# Patient Record
Sex: FEMALE | Race: WHITE | Employment: UNEMPLOYED | URBAN - METROPOLITAN AREA
[De-identification: names, ages, dates, MRNs, and addresses within clinical notes are randomized per-mention and may not be internally consistent; named-entity substitution may affect disease eponyms.]

---

## 2017-01-24 ENCOUNTER — HOSPITAL ENCOUNTER (EMERGENCY)
Age: 22
Discharge: HOME | End: 2017-01-25
Attending: EMERGENCY MEDICINE
Payer: COMMERCIAL

## 2017-01-24 ENCOUNTER — APPOINTMENT (OUTPATIENT)
Dept: ULTRASOUND IMAGING | Age: 22
End: 2017-01-24
Attending: EMERGENCY MEDICINE
Payer: COMMERCIAL

## 2017-01-24 DIAGNOSIS — O20.9 VAGINAL BLEEDING IN PREGNANCY, FIRST TRIMESTER: Primary | ICD-10-CM

## 2017-01-24 DIAGNOSIS — O20.0 MISCARRIAGE, THREATENED, EARLY PREGNANCY: ICD-10-CM

## 2017-01-24 LAB
ABO + RH BLD: NORMAL
ALBUMIN SERPL BCP-MCNC: 3.6 G/DL (ref 3.5–5)
ALBUMIN/GLOB SERPL: 1 {RATIO} (ref 1.1–2.2)
ALP SERPL-CCNC: 65 U/L (ref 45–117)
ALT SERPL-CCNC: 24 U/L (ref 12–78)
ANION GAP BLD CALC-SCNC: 10 MMOL/L (ref 5–15)
APPEARANCE UR: CLEAR
AST SERPL W P-5'-P-CCNC: 10 U/L (ref 15–37)
BACTERIA URNS QL MICRO: ABNORMAL /HPF
BASOPHILS # BLD AUTO: 0 K/UL (ref 0–0.1)
BASOPHILS # BLD: 0 % (ref 0–1)
BILIRUB SERPL-MCNC: 0.1 MG/DL (ref 0.2–1)
BILIRUB UR QL: NEGATIVE
BLOOD GROUP ANTIBODIES SERPL: NORMAL
BUN SERPL-MCNC: 13 MG/DL (ref 6–20)
BUN/CREAT SERPL: 16 (ref 12–20)
CALCIUM SERPL-MCNC: 8.5 MG/DL (ref 8.5–10.1)
CHLORIDE SERPL-SCNC: 108 MMOL/L (ref 97–108)
CLUE CELLS VAG QL WET PREP: NORMAL
CO2 SERPL-SCNC: 24 MMOL/L (ref 21–32)
COLOR UR: ABNORMAL
CREAT SERPL-MCNC: 0.83 MG/DL (ref 0.55–1.02)
EOSINOPHIL # BLD: 0.1 K/UL (ref 0–0.4)
EOSINOPHIL NFR BLD: 2 % (ref 0–7)
EPITH CASTS URNS QL MICRO: ABNORMAL /LPF
ERYTHROCYTE [DISTWIDTH] IN BLOOD BY AUTOMATED COUNT: 15.2 % (ref 11.5–14.5)
GLOBULIN SER CALC-MCNC: 3.5 G/DL (ref 2–4)
GLUCOSE SERPL-MCNC: 102 MG/DL (ref 65–100)
GLUCOSE UR STRIP.AUTO-MCNC: NEGATIVE MG/DL
HCG SERPL-ACNC: 20 MIU/ML (ref 0–6)
HCT VFR BLD AUTO: 37.9 % (ref 35–47)
HGB BLD-MCNC: 12.3 G/DL (ref 11.5–16)
HGB UR QL STRIP: ABNORMAL
KETONES UR QL STRIP.AUTO: NEGATIVE MG/DL
KOH PREP SPEC: NORMAL
LEUKOCYTE ESTERASE UR QL STRIP.AUTO: NEGATIVE
LYMPHOCYTES # BLD AUTO: 31 % (ref 12–49)
LYMPHOCYTES # BLD: 2.5 K/UL (ref 0.8–3.5)
MCH RBC QN AUTO: 26.7 PG (ref 26–34)
MCHC RBC AUTO-ENTMCNC: 32.5 G/DL (ref 30–36.5)
MCV RBC AUTO: 82.2 FL (ref 80–99)
MONOCYTES # BLD: 0.6 K/UL (ref 0–1)
MONOCYTES NFR BLD AUTO: 8 % (ref 5–13)
NEUTS SEG # BLD: 4.8 K/UL (ref 1.8–8)
NEUTS SEG NFR BLD AUTO: 59 % (ref 32–75)
NITRITE UR QL STRIP.AUTO: NEGATIVE
PH UR STRIP: 7.5 [PH] (ref 5–8)
PLATELET # BLD AUTO: 302 K/UL (ref 150–400)
POTASSIUM SERPL-SCNC: 3.7 MMOL/L (ref 3.5–5.1)
PROT SERPL-MCNC: 7.1 G/DL (ref 6.4–8.2)
PROT UR STRIP-MCNC: NEGATIVE MG/DL
RBC # BLD AUTO: 4.61 M/UL (ref 3.8–5.2)
RBC #/AREA URNS HPF: ABNORMAL /HPF (ref 0–5)
SERVICE CMNT-IMP: NORMAL
SODIUM SERPL-SCNC: 142 MMOL/L (ref 136–145)
SP GR UR REFRACTOMETRY: 1.01 (ref 1–1.03)
SPECIMEN EXP DATE BLD: NORMAL
T VAGINALIS VAG QL WET PREP: NORMAL
UA: UC IF INDICATED,UAUC: ABNORMAL
UROBILINOGEN UR QL STRIP.AUTO: 0.2 EU/DL (ref 0.2–1)
WBC # BLD AUTO: 8 K/UL (ref 3.6–11)
WBC URNS QL MICRO: ABNORMAL /HPF (ref 0–4)

## 2017-01-24 PROCEDURE — 76817 TRANSVAGINAL US OBSTETRIC: CPT

## 2017-01-24 PROCEDURE — 86900 BLOOD TYPING SEROLOGIC ABO: CPT | Performed by: EMERGENCY MEDICINE

## 2017-01-24 PROCEDURE — 87086 URINE CULTURE/COLONY COUNT: CPT | Performed by: EMERGENCY MEDICINE

## 2017-01-24 PROCEDURE — 99284 EMERGENCY DEPT VISIT MOD MDM: CPT

## 2017-01-24 PROCEDURE — 87491 CHLMYD TRACH DNA AMP PROBE: CPT | Performed by: EMERGENCY MEDICINE

## 2017-01-24 PROCEDURE — 36415 COLL VENOUS BLD VENIPUNCTURE: CPT | Performed by: EMERGENCY MEDICINE

## 2017-01-24 PROCEDURE — 80053 COMPREHEN METABOLIC PANEL: CPT | Performed by: EMERGENCY MEDICINE

## 2017-01-24 PROCEDURE — 87210 SMEAR WET MOUNT SALINE/INK: CPT | Performed by: EMERGENCY MEDICINE

## 2017-01-24 PROCEDURE — 81001 URINALYSIS AUTO W/SCOPE: CPT | Performed by: EMERGENCY MEDICINE

## 2017-01-24 PROCEDURE — 85025 COMPLETE CBC W/AUTO DIFF WBC: CPT | Performed by: EMERGENCY MEDICINE

## 2017-01-24 PROCEDURE — 84702 CHORIONIC GONADOTROPIN TEST: CPT | Performed by: EMERGENCY MEDICINE

## 2017-01-24 NOTE — LETTER
[place] [address] 
[facility] EMERGENCY DEPT 
[address] [place] [address] [address] [address] [phone] Work/School Note Date: 1/24/2017 To Whom It May concern: 
 
Edil Dhillon was seen and treated today in the emergency room by the following provider(s): 
Attending Provider: Halima Cook MD. Edil Dhillon may return to work on 1/27/17.  
 
Sincerely, 
 
 
 
 
Halima Cook MD

## 2017-01-25 VITALS
OXYGEN SATURATION: 98 % | DIASTOLIC BLOOD PRESSURE: 78 MMHG | HEART RATE: 86 BPM | RESPIRATION RATE: 16 BRPM | HEIGHT: 61 IN | WEIGHT: 158.51 LBS | SYSTOLIC BLOOD PRESSURE: 118 MMHG | TEMPERATURE: 97.9 F | BODY MASS INDEX: 29.93 KG/M2

## 2017-01-25 LAB
C TRACH DNA SPEC QL NAA+PROBE: NEGATIVE
N GONORRHOEA DNA SPEC QL NAA+PROBE: NEGATIVE
SAMPLE TYPE: NORMAL
SERVICE CMNT-IMP: NORMAL
SPECIMEN SOURCE: NORMAL

## 2017-01-25 NOTE — DISCHARGE INSTRUCTIONS

## 2017-01-25 NOTE — ED NOTES
Patient back from [address] [place] [address],[address], resting comfortably in stretcher at this time.

## 2017-01-25 NOTE — ED PROVIDER NOTES
HPI Comments: Waldemar Harding is a 6 week gravid by date, 23 y.o. female () who presents ambulatory to the ED with c/o lower abd pain and vaginal bleeding x 1700 this evening. She describes the pain as cramping in nature and states the bleeding is heavier than just spotting but lighter than a normal MP. She denies having similar issues with bleeding with both her past pregnancies. She further denies any urinary sxs, nausea, or vomiting. OBGYN: Ernesto Madsen MD  PCP: Samantha Verdugo MD   PMHx significant for: pt denies   PSHx significant for: hip surgeries,  x 2, lap cholecystectomy   Social Hx:  smoking (-)                      alcohol (-)                      illicit drugs (-) denies                       There are no other complaints, changes, or physical findings at this time. Written by EDUARDO Mota, as dictated by Pearl Coffman MD     The history is provided by the patient. Past Medical History:   Diagnosis Date    Chronic pain      right hip       Past Surgical History:   Procedure Laterality Date    Hx wisdom teeth extraction      Hx orthopaedic  2012     arthroscopy right hip    Hx other surgical       right hip surgery- collapse of labrum     Pr abdomen surgery proc unlisted  7/1/15     CHOLECYSTECTOMY LAPAROSCOPIC W/GRAMS     Pr  delivery only           Family History:   Problem Relation Age of Onset    Hypertension Mother        Social History     Social History    Marital status:      Spouse name: N/A    Number of children: N/A    Years of education: N/A     Occupational History    Not on file.      Social History Main Topics    Smoking status: Never Smoker    Smokeless tobacco: Not on file    Alcohol use No    Drug use: No    Sexual activity: No     Other Topics Concern    Not on file     Social History Narrative    ** Merged History Encounter **              ALLERGIES: Review of patient's allergies indicates no known allergies. Review of Systems   Constitutional: Negative. Negative for fever. HENT: Negative. Eyes: Negative. Respiratory: Negative. Negative for shortness of breath. Cardiovascular: Negative. Negative for chest pain. Gastrointestinal: Positive for abdominal pain. Negative for nausea and vomiting. Endocrine: Negative. Genitourinary: Positive for vaginal bleeding. Negative for difficulty urinating, dysuria and hematuria. Musculoskeletal: Negative. Skin: Negative. Allergic/Immunologic: Negative. Neurological: Negative. Psychiatric/Behavioral: Negative for suicidal ideas. All other systems reviewed and are negative. Patient Vitals for the past 12 hrs:   Temp Pulse Resp BP SpO2   01/25/17 0018 - - - - 99 %   01/25/17 0015 - - - 101/57 -   01/24/17 2245 - - - 108/67 98 %   01/24/17 2215 - - - 117/72 98 %   01/24/17 2138 97.9 °F (36.6 °C) 86 16 141/83 98 %             Physical Exam   Constitutional: She is oriented to person, place, and time. She appears well-developed and well-nourished. No distress. HENT:   Head: Normocephalic and atraumatic. Nose: Nose normal.   Eyes: Conjunctivae and EOM are normal. No scleral icterus. Neck: Normal range of motion. No tracheal deviation present. Cardiovascular: Normal rate, regular rhythm, normal heart sounds and intact distal pulses. Exam reveals no friction rub. No murmur heard. Pulmonary/Chest: Effort normal and breath sounds normal. No stridor. No respiratory distress. She has no wheezes. She has no rales. Abdominal: Soft. Bowel sounds are normal. She exhibits no distension. There is no tenderness. There is no rebound. Genitourinary: Pelvic exam was performed with patient supine. There is no rash or tenderness on the right labia. There is no rash on the left labia. Uterus is enlarged (slightly). Cervix exhibits no motion tenderness and no friability. Right adnexum displays no mass, no tenderness and no fullness.  Left adnexum displays no mass, no tenderness and no fullness. There is bleeding (from os, small amount, dark red, os closed) in the vagina. No vaginal discharge found. Musculoskeletal: Normal range of motion. She exhibits no tenderness. Neurological: She is alert and oriented to person, place, and time. No cranial nerve deficit. Skin: Skin is warm and dry. No rash noted. She is not diaphoretic. Psychiatric: She has a normal mood and affect. Her behavior is normal. Judgment and thought content normal.   Nursing note and vitals reviewed. MDM  Number of Diagnoses or Management Options  Miscarriage, threatened, early pregnancy:   Vaginal bleeding in pregnancy, first trimester:   Diagnosis management comments: DDX:  Miscarriage, threatened ab, subchorionic hemorrhage, uti    Plan:  Labs, ua, type and screen, US    Impression:  Threatened miscarriage         Amount and/or Complexity of Data Reviewed  Clinical lab tests: ordered and reviewed  Tests in the radiology section of CPT®: ordered and reviewed  Review and summarize past medical records: yes  Independent visualization of images, tracings, or specimens: yes      ED Course       Procedures     I reviewed our electronic medical record system for any past medical records that were available that may contribute to the patients current condition, the nursing notes and and vital signs from today's visit    Nursing notes will be reviewed as they become available in realtime while the pt is in the ED. Harrison Berry MD     Procedure Note - Pelvic Exam:    11:15 PM  Performed by: Harrison Berry MD   Chaperoned by: [address], RN  Pelvic exam was performed using bimanual and speculum. Small amount of dark red bleeding from the cervical os, os is closed. No tenderness on exam, no discharge  The procedure took 1-15 minutes, and pt tolerated well. Written by Mar Voss ED Scribe, as dictated by Harrison Berry MD .     Progress Note:  11:57 PM  Beta hcg is only 20. Pt back from [address] [place] [address],[address], awaiting results. Written by Dontae Voss, ED scribe, as dictated by Ayo Varghese MD      Progress note:  1:11 AM  US resulted, shows no gestational sac or IUP. Pt noted to be feeling better, states she has gone to the bathroom and her bleeding has ceased. She is ready for discharge. Discussed lab and imaging findings with pt and the plan for d/c home to f/u with her OBGYN. Will follow up as instructed. All questions have been answered, pt voiced understanding and agreement with plan. If narcotics were prescribed, pt was advised not to drive or operate heavy machinery. If abx were prescribed, pt advised that diarrhea and rash are possible side effects of the medications. Specific return precautions provided as well as instructions to return to the ED should sx worsen at any time. Ayo Varghese MD      I personally reviewed pt's imaging. Official read by radiology listed below. Ayo Varghese MD     LABORATORY TESTS:  Recent Results (from the past 12 hour(s))   CBC WITH AUTOMATED DIFF    Collection Time: 01/24/17 10:07 PM   Result Value Ref Range    WBC 8.0 3.6 - 11.0 K/uL    RBC 4.61 3.80 - 5.20 M/uL    HGB 12.3 11.5 - 16.0 g/dL    HCT 37.9 35.0 - 47.0 %    MCV 82.2 80.0 - 99.0 FL    MCH 26.7 26.0 - 34.0 PG    MCHC 32.5 30.0 - 36.5 g/dL    RDW 15.2 (H) 11.5 - 14.5 %    PLATELET 794 266 - 956 K/uL    NEUTROPHILS 59 32 - 75 %    LYMPHOCYTES 31 12 - 49 %    MONOCYTES 8 5 - 13 %    EOSINOPHILS 2 0 - 7 %    BASOPHILS 0 0 - 1 %    ABS. NEUTROPHILS 4.8 1.8 - 8.0 K/UL    ABS. LYMPHOCYTES 2.5 0.8 - 3.5 K/UL    ABS. MONOCYTES 0.6 0.0 - 1.0 K/UL    ABS. EOSINOPHILS 0.1 0.0 - 0.4 K/UL    ABS. BASOPHILS 0.0 0.0 - 0.1 K/UL   TOTAL HCG, QT.     Collection Time: 01/24/17 10:07 PM   Result Value Ref Range    HCG, Qt. 20 (H) 0 - 6 MIU/ML   TYPE & SCREEN    Collection Time: 01/24/17 10:07 PM   Result Value Ref Range    Crossmatch Expiration 01/27/2017     ABO/Rh(D) Cady Galvin POSITIVE     Antibody screen NEG WET PREP    Collection Time: 01/24/17 10:07 PM   Result Value Ref Range    Clue cells CLUE CELLS ABSENT      Wet prep NO TRICHOMONAS SEEN     KOH, OTHER SOURCES    Collection Time: 01/24/17 10:07 PM   Result Value Ref Range    Special Requests: NO SPECIAL REQUESTS      KOH NO YEAST SEEN     METABOLIC PANEL, COMPREHENSIVE    Collection Time: 01/24/17 10:07 PM   Result Value Ref Range    Sodium 142 136 - 145 mmol/L    Potassium 3.7 3.5 - 5.1 mmol/L    Chloride 108 97 - 108 mmol/L    CO2 24 21 - 32 mmol/L    Anion gap 10 5 - 15 mmol/L    Glucose 102 (H) 65 - 100 mg/dL    BUN 13 6 - 20 MG/DL    Creatinine 0.83 0.55 - 1.02 MG/DL    BUN/Creatinine ratio 16 12 - 20      GFR est AA >60 >60 ml/min/1.73m2    GFR est non-AA >60 >60 ml/min/1.73m2    Calcium 8.5 8.5 - 10.1 MG/DL    Bilirubin, total 0.1 (L) 0.2 - 1.0 MG/DL    ALT 24 12 - 78 U/L    AST 10 (L) 15 - 37 U/L    Alk. phosphatase 65 45 - 117 U/L    Protein, total 7.1 6.4 - 8.2 g/dL    Albumin 3.6 3.5 - 5.0 g/dL    Globulin 3.5 2.0 - 4.0 g/dL    A-G Ratio 1.0 (L) 1.1 - 2.2     URINALYSIS W/ REFLEX CULTURE    Collection Time: 01/24/17 10:59 PM   Result Value Ref Range    Color YELLOW/STRAW      Appearance CLEAR CLEAR      Specific gravity 1.015 1.003 - 1.030      pH (UA) 7.5 5.0 - 8.0      Protein NEGATIVE  NEG mg/dL    Glucose NEGATIVE  NEG mg/dL    Ketone NEGATIVE  NEG mg/dL    Bilirubin NEGATIVE  NEG      Blood LARGE (A) NEG      Urobilinogen 0.2 0.2 - 1.0 EU/dL    Nitrites NEGATIVE  NEG      Leukocyte Esterase NEGATIVE  NEG      WBC 0-4 0 - 4 /hpf    RBC 20-50 0 - 5 /hpf    Epithelial cells MODERATE (A) FEW /lpf    Bacteria 1+ (A) NEG /hpf    UA:UC IF INDICATED URINE CULTURE ORDERED (A) CNI         IMAGING RESULTS:  Study Result      Clinical history: Bleeding and abdominal pain  The patient is 6-7 weeks pregnant. Last menstrual period 6 weeks and 2 days  prior.   INDICATION: Bleeding and abdominal pain        Transvaginal ultrasound: Realtime sonographic imaging of the pelvis was  performed transvaginally. By report the patient is 6-7 weeks pregnant. There is  no intrauterine gestational sac identified. The uterus measures 8.5 x 4.7 x 6.1  cm. The right ovary 2.2 x 2.6 cm the left ovary 3.4 x 2.0 cm. Endometrial stripe  1.6 cm. . There is normal flow identified to both ovaries.     They are normal in appearance. No free fluid.      IMPRESSION  IMPRESSION:     No IUP is identified.     The cervix is within normal limits.     No evidence of ovarian torsion. MEDICATIONS GIVEN:  Medications - No data to display    IMPRESSION:  1. Vaginal bleeding in pregnancy, first trimester    2. Miscarriage, threatened, early pregnancy        PLAN:  1. Discharge home  Current Discharge Medication List      CONTINUE these medications which have NOT CHANGED    Details   PRENATAL VIT/IRON FUMARATE/FA (PRENATAL PO) Take  by mouth. Follow-up Information     Follow up With Details Comments Contact Info    Raoul Hester MD Call today to have an appointment and f/u for hcg testing [phone]   [facility] [place]  [address] [address] [phone]        Return to ED if worse     DISCHARGE NOTE  1:12 AM   Pt has been re-evaluated. She has no new complaints, changes, or physical findings. All diagnostic results have been reviewed and discussed with the pt. Care plan has been outlined and discussed, and she understands all current sx, dx, tx, and rx. All of the pt's questions have been answered and concerns addressed. All medications have been reviewed with the pt. She was instructed to and agrees to follow up with her OBGYN, or to return to the ED should her sxs worsen prior to follow up. Elias Cody is ready for discharge. This note is prepared by Levi Ledesma, acting as scribe for Ani Lepe MD.     Ani Lepe MD: The scribe's documentation has been prepared under my direction and personally reviewed by me in its entirety.  I confirm that the note above accurately reflects all work, treatment, procedures, and medical decision making performed by me                                         This note will not be viewable in [address]

## 2017-01-25 NOTE — ED NOTES
Dr. Jaja Wolfe has reviewed discharge instructions with the patient. The patient   verbalized understanding. Patient ambulatory to car with family.

## 2017-01-25 NOTE — ED NOTES
Assumed care of patient from triage. Patient arrives with complaints of lower abdominal cramping that started tonight ~1700 and that she then started to experience vaginal bleeding. Patient states that she is 6-7 weeks pregnant. Patient reports that the bleeding is not as heavy as usual period but that it is heavier than spotting. Patient states last period was 12/11/16. Patient resting comfortably on stretcher.

## 2017-01-26 LAB
BACTERIA SPEC CULT: NORMAL
CC UR VC: NORMAL
SERVICE CMNT-IMP: NORMAL

## 2017-04-24 LAB
ANTIBODY SCREEN, EXTERNAL: NEGATIVE
CHLAMYDIA, EXTERNAL: NEGATIVE
HBSAG, EXTERNAL: NEGATIVE
N. GONORRHEA, EXTERNAL: NEGATIVE
RUBELLA, EXTERNAL: NORMAL
T. PALLIDUM, EXTERNAL: NEGATIVE

## 2018-01-04 LAB
CHLAMYDIA, EXTERNAL: NEGATIVE
HBSAG, EXTERNAL: NEGATIVE
HIV, EXTERNAL: NON REACTIVE
N. GONORRHEA, EXTERNAL: NEGATIVE
RUBELLA, EXTERNAL: NORMAL

## 2018-04-20 LAB
HBSAG, EXTERNAL: NEGATIVE
T. PALLIDUM, EXTERNAL: NEGATIVE

## 2018-06-19 LAB — GRBS, EXTERNAL: NEGATIVE

## 2018-07-09 ENCOUNTER — HOSPITAL ENCOUNTER (INPATIENT)
Age: 23
LOS: 2 days | Discharge: HOME | End: 2018-07-11
Attending: OBSTETRICS & GYNECOLOGY | Admitting: OBSTETRICS & GYNECOLOGY
Payer: COMMERCIAL

## 2018-07-09 ENCOUNTER — ANESTHESIA (OUTPATIENT)
Dept: LABOR AND DELIVERY | Age: 23
End: 2018-07-09
Payer: COMMERCIAL

## 2018-07-09 ENCOUNTER — ANESTHESIA EVENT (OUTPATIENT)
Dept: LABOR AND DELIVERY | Age: 23
End: 2018-07-09
Payer: COMMERCIAL

## 2018-07-09 LAB
ABO + RH BLD: NORMAL
BASOPHILS # BLD: 0 K/UL (ref 0–0.1)
BASOPHILS NFR BLD: 0 % (ref 0–1)
BLOOD GROUP ANTIBODIES SERPL: NORMAL
DIFFERENTIAL METHOD BLD: ABNORMAL
EOSINOPHIL # BLD: 0.1 K/UL (ref 0–0.4)
EOSINOPHIL NFR BLD: 1 % (ref 0–7)
ERYTHROCYTE [DISTWIDTH] IN BLOOD BY AUTOMATED COUNT: 14.6 % (ref 11.5–14.5)
ERYTHROCYTE [DISTWIDTH] IN BLOOD BY AUTOMATED COUNT: 14.6 % (ref 11.5–14.5)
HCT VFR BLD AUTO: 29.9 % (ref 35–47)
HCT VFR BLD AUTO: 35 % (ref 35–47)
HGB BLD-MCNC: 11.3 G/DL (ref 11.5–16)
HGB BLD-MCNC: 9.6 G/DL (ref 11.5–16)
IMM GRANULOCYTES # BLD: 0 K/UL (ref 0–0.04)
IMM GRANULOCYTES NFR BLD AUTO: 0 % (ref 0–0.5)
LYMPHOCYTES # BLD: 2.3 K/UL (ref 0.8–3.5)
LYMPHOCYTES NFR BLD: 29 % (ref 12–49)
MCH RBC QN AUTO: 26.2 PG (ref 26–34)
MCH RBC QN AUTO: 26.5 PG (ref 26–34)
MCHC RBC AUTO-ENTMCNC: 32.1 G/DL (ref 30–36.5)
MCHC RBC AUTO-ENTMCNC: 32.3 G/DL (ref 30–36.5)
MCV RBC AUTO: 81.2 FL (ref 80–99)
MCV RBC AUTO: 82.6 FL (ref 80–99)
MONOCYTES # BLD: 0.9 K/UL (ref 0–1)
MONOCYTES NFR BLD: 11 % (ref 5–13)
NEUTS SEG # BLD: 4.5 K/UL (ref 1.8–8)
NEUTS SEG NFR BLD: 58 % (ref 32–75)
NRBC # BLD: 0 K/UL (ref 0–0.01)
NRBC # BLD: 0 K/UL (ref 0–0.01)
NRBC BLD-RTO: 0 PER 100 WBC
NRBC BLD-RTO: 0 PER 100 WBC
PLATELET # BLD AUTO: 195 K/UL (ref 150–400)
PLATELET # BLD AUTO: 260 K/UL (ref 150–400)
PMV BLD AUTO: 10.5 FL (ref 8.9–12.9)
PMV BLD AUTO: 10.9 FL (ref 8.9–12.9)
RBC # BLD AUTO: 3.62 M/UL (ref 3.8–5.2)
RBC # BLD AUTO: 4.31 M/UL (ref 3.8–5.2)
SPECIMEN EXP DATE BLD: NORMAL
WBC # BLD AUTO: 11.2 K/UL (ref 3.6–11)
WBC # BLD AUTO: 7.7 K/UL (ref 3.6–11)

## 2018-07-09 PROCEDURE — 74011250636 HC RX REV CODE- 250/636: Performed by: OBSTETRICS & GYNECOLOGY

## 2018-07-09 PROCEDURE — 76010000391 HC C SECN FIRST 1 HR: Performed by: OBSTETRICS & GYNECOLOGY

## 2018-07-09 PROCEDURE — 74011250636 HC RX REV CODE- 250/636

## 2018-07-09 PROCEDURE — 77030014125 HC TY EPDRL BBMI -B: Performed by: NURSE ANESTHETIST, CERTIFIED REGISTERED

## 2018-07-09 PROCEDURE — 86900 BLOOD TYPING SEROLOGIC ABO: CPT | Performed by: OBSTETRICS & GYNECOLOGY

## 2018-07-09 PROCEDURE — 77030018846 HC SOL IRR STRL H20 ICUM -A

## 2018-07-09 PROCEDURE — 85027 COMPLETE CBC AUTOMATED: CPT | Performed by: OBSTETRICS & GYNECOLOGY

## 2018-07-09 PROCEDURE — 77030002974 HC SUT PLN J&J -A

## 2018-07-09 PROCEDURE — 75410000002 HC LABOR FEE PER 1 HR

## 2018-07-09 PROCEDURE — 76060000078 HC EPIDURAL ANESTHESIA: Performed by: OBSTETRICS & GYNECOLOGY

## 2018-07-09 PROCEDURE — 77030018836 HC SOL IRR NACL ICUM -A

## 2018-07-09 PROCEDURE — 77030003666 HC NDL SPINAL BD -A: Performed by: NURSE ANESTHETIST, CERTIFIED REGISTERED

## 2018-07-09 PROCEDURE — 74011000250 HC RX REV CODE- 250

## 2018-07-09 PROCEDURE — 77030002933 HC SUT MCRYL J&J -A

## 2018-07-09 PROCEDURE — 74011250637 HC RX REV CODE- 250/637: Performed by: OBSTETRICS & GYNECOLOGY

## 2018-07-09 PROCEDURE — 36415 COLL VENOUS BLD VENIPUNCTURE: CPT | Performed by: OBSTETRICS & GYNECOLOGY

## 2018-07-09 PROCEDURE — 65410000002 HC RM PRIVATE OB

## 2018-07-09 PROCEDURE — 76010000392 HC C SECN EA ADDL 0.5 HR: Performed by: OBSTETRICS & GYNECOLOGY

## 2018-07-09 PROCEDURE — 85025 COMPLETE CBC W/AUTO DIFF WBC: CPT | Performed by: OBSTETRICS & GYNECOLOGY

## 2018-07-09 PROCEDURE — 75410000003 HC RECOV DEL/VAG/CSECN EA 0.5 HR

## 2018-07-09 PROCEDURE — 77030031139 HC SUT VCRL2 J&J -A

## 2018-07-09 PROCEDURE — 77030008459 HC STPLR SKN COOP -B

## 2018-07-09 PROCEDURE — 76060000033 HC ANESTHESIA 1 TO 1.5 HR: Performed by: OBSTETRICS & GYNECOLOGY

## 2018-07-09 PROCEDURE — 77010026065 HC OXYGEN MINIMUM MEDICAL AIR

## 2018-07-09 RX ORDER — SODIUM CHLORIDE, SODIUM LACTATE, POTASSIUM CHLORIDE, CALCIUM CHLORIDE 600; 310; 30; 20 MG/100ML; MG/100ML; MG/100ML; MG/100ML
1000 INJECTION, SOLUTION INTRAVENOUS CONTINUOUS
Status: DISCONTINUED | OUTPATIENT
Start: 2018-07-09 | End: 2018-07-09 | Stop reason: HOSPADM

## 2018-07-09 RX ORDER — NALOXONE HYDROCHLORIDE 0.4 MG/ML
0.2 INJECTION, SOLUTION INTRAMUSCULAR; INTRAVENOUS; SUBCUTANEOUS
Status: ACTIVE | OUTPATIENT
Start: 2018-07-09 | End: 2018-07-10

## 2018-07-09 RX ORDER — ONDANSETRON 2 MG/ML
4 INJECTION INTRAMUSCULAR; INTRAVENOUS
Status: DISCONTINUED | OUTPATIENT
Start: 2018-07-09 | End: 2018-07-11 | Stop reason: HOSPADM

## 2018-07-09 RX ORDER — SIMETHICONE 80 MG
80 TABLET,CHEWABLE ORAL AS NEEDED
Status: DISCONTINUED | OUTPATIENT
Start: 2018-07-09 | End: 2018-07-11 | Stop reason: HOSPADM

## 2018-07-09 RX ORDER — BUPIVACAINE HYDROCHLORIDE 7.5 MG/ML
INJECTION, SOLUTION EPIDURAL; RETROBULBAR AS NEEDED
Status: DISCONTINUED | OUTPATIENT
Start: 2018-07-09 | End: 2018-07-09 | Stop reason: HOSPADM

## 2018-07-09 RX ORDER — SODIUM CHLORIDE 0.9 % (FLUSH) 0.9 %
5-10 SYRINGE (ML) INJECTION AS NEEDED
Status: DISCONTINUED | OUTPATIENT
Start: 2018-07-09 | End: 2018-07-09 | Stop reason: HOSPADM

## 2018-07-09 RX ORDER — ACETAMINOPHEN 10 MG/ML
INJECTION, SOLUTION INTRAVENOUS AS NEEDED
Status: DISCONTINUED | OUTPATIENT
Start: 2018-07-09 | End: 2018-07-09 | Stop reason: HOSPADM

## 2018-07-09 RX ORDER — OXYTOCIN/RINGER'S LACTATE 20/1000 ML
125-500 PLASTIC BAG, INJECTION (ML) INTRAVENOUS ONCE
Status: ACTIVE | OUTPATIENT
Start: 2018-07-09 | End: 2018-07-09

## 2018-07-09 RX ORDER — SODIUM CHLORIDE, SODIUM LACTATE, POTASSIUM CHLORIDE, CALCIUM CHLORIDE 600; 310; 30; 20 MG/100ML; MG/100ML; MG/100ML; MG/100ML
125 INJECTION, SOLUTION INTRAVENOUS CONTINUOUS
Status: DISCONTINUED | OUTPATIENT
Start: 2018-07-09 | End: 2018-07-11 | Stop reason: HOSPADM

## 2018-07-09 RX ORDER — ONDANSETRON 2 MG/ML
INJECTION INTRAMUSCULAR; INTRAVENOUS AS NEEDED
Status: DISCONTINUED | OUTPATIENT
Start: 2018-07-09 | End: 2018-07-09 | Stop reason: HOSPADM

## 2018-07-09 RX ORDER — KETOROLAC TROMETHAMINE 30 MG/ML
30 INJECTION, SOLUTION INTRAMUSCULAR; INTRAVENOUS
Status: DISPENSED | OUTPATIENT
Start: 2018-07-09 | End: 2018-07-10

## 2018-07-09 RX ORDER — OXYCODONE AND ACETAMINOPHEN 5; 325 MG/1; MG/1
2 TABLET ORAL
Status: DISCONTINUED | OUTPATIENT
Start: 2018-07-09 | End: 2018-07-11 | Stop reason: HOSPADM

## 2018-07-09 RX ORDER — NALOXONE HYDROCHLORIDE 0.4 MG/ML
0.4 INJECTION, SOLUTION INTRAMUSCULAR; INTRAVENOUS; SUBCUTANEOUS AS NEEDED
Status: DISCONTINUED | OUTPATIENT
Start: 2018-07-09 | End: 2018-07-11 | Stop reason: HOSPADM

## 2018-07-09 RX ORDER — PHENYLEPHRINE HCL IN 0.9% NACL 0.4MG/10ML
SYRINGE (ML) INTRAVENOUS AS NEEDED
Status: DISCONTINUED | OUTPATIENT
Start: 2018-07-09 | End: 2018-07-09 | Stop reason: HOSPADM

## 2018-07-09 RX ORDER — SODIUM CHLORIDE 0.9 % (FLUSH) 0.9 %
5-10 SYRINGE (ML) INJECTION AS NEEDED
Status: DISCONTINUED | OUTPATIENT
Start: 2018-07-09 | End: 2018-07-11 | Stop reason: HOSPADM

## 2018-07-09 RX ORDER — IBUPROFEN 400 MG/1
800 TABLET ORAL EVERY 8 HOURS
Status: DISCONTINUED | OUTPATIENT
Start: 2018-07-09 | End: 2018-07-11 | Stop reason: HOSPADM

## 2018-07-09 RX ORDER — CEFAZOLIN SODIUM/WATER 2 G/20 ML
2 SYRINGE (ML) INTRAVENOUS ONCE
Status: COMPLETED | OUTPATIENT
Start: 2018-07-09 | End: 2018-07-09

## 2018-07-09 RX ORDER — DIPHENHYDRAMINE HYDROCHLORIDE 50 MG/ML
12.5 INJECTION, SOLUTION INTRAMUSCULAR; INTRAVENOUS
Status: DISCONTINUED | OUTPATIENT
Start: 2018-07-09 | End: 2018-07-11 | Stop reason: HOSPADM

## 2018-07-09 RX ORDER — OXYTOCIN/RINGER'S LACTATE 20/1000 ML
PLASTIC BAG, INJECTION (ML) INTRAVENOUS
Status: DISCONTINUED | OUTPATIENT
Start: 2018-07-09 | End: 2018-07-09 | Stop reason: HOSPADM

## 2018-07-09 RX ORDER — DOCUSATE SODIUM 100 MG/1
100 CAPSULE, LIQUID FILLED ORAL 2 TIMES DAILY
Status: DISCONTINUED | OUTPATIENT
Start: 2018-07-09 | End: 2018-07-11 | Stop reason: HOSPADM

## 2018-07-09 RX ORDER — EPHEDRINE SULFATE 50 MG/ML
INJECTION, SOLUTION INTRAVENOUS AS NEEDED
Status: DISCONTINUED | OUTPATIENT
Start: 2018-07-09 | End: 2018-07-09 | Stop reason: HOSPADM

## 2018-07-09 RX ORDER — MORPHINE SULFATE 0.5 MG/ML
INJECTION, SOLUTION EPIDURAL; INTRATHECAL; INTRAVENOUS AS NEEDED
Status: DISCONTINUED | OUTPATIENT
Start: 2018-07-09 | End: 2018-07-09 | Stop reason: HOSPADM

## 2018-07-09 RX ORDER — SODIUM CHLORIDE 0.9 % (FLUSH) 0.9 %
5-10 SYRINGE (ML) INJECTION EVERY 8 HOURS
Status: DISCONTINUED | OUTPATIENT
Start: 2018-07-09 | End: 2018-07-11 | Stop reason: HOSPADM

## 2018-07-09 RX ORDER — SODIUM CHLORIDE 0.9 % (FLUSH) 0.9 %
5-10 SYRINGE (ML) INJECTION EVERY 8 HOURS
Status: DISCONTINUED | OUTPATIENT
Start: 2018-07-09 | End: 2018-07-09 | Stop reason: HOSPADM

## 2018-07-09 RX ADMIN — Medication 80 MCG: at 08:15

## 2018-07-09 RX ADMIN — IBUPROFEN 800 MG: 400 TABLET ORAL at 21:27

## 2018-07-09 RX ADMIN — MORPHINE SULFATE 500 MCG: 0.5 INJECTION, SOLUTION EPIDURAL; INTRATHECAL; INTRAVENOUS at 08:07

## 2018-07-09 RX ADMIN — SODIUM CHLORIDE, SODIUM LACTATE, POTASSIUM CHLORIDE, AND CALCIUM CHLORIDE 125 ML/HR: 600; 310; 30; 20 INJECTION, SOLUTION INTRAVENOUS at 15:58

## 2018-07-09 RX ADMIN — Medication 80 MCG: at 08:28

## 2018-07-09 RX ADMIN — Medication: at 08:46

## 2018-07-09 RX ADMIN — Medication 120 MCG: at 08:10

## 2018-07-09 RX ADMIN — EPHEDRINE SULFATE 5 MG: 50 INJECTION, SOLUTION INTRAVENOUS at 08:14

## 2018-07-09 RX ADMIN — Medication: at 08:25

## 2018-07-09 RX ADMIN — EPHEDRINE SULFATE 5 MG: 50 INJECTION, SOLUTION INTRAVENOUS at 08:09

## 2018-07-09 RX ADMIN — SODIUM CHLORIDE, SODIUM LACTATE, POTASSIUM CHLORIDE, AND CALCIUM CHLORIDE 125 ML/HR: 600; 310; 30; 20 INJECTION, SOLUTION INTRAVENOUS at 09:00

## 2018-07-09 RX ADMIN — SODIUM CHLORIDE, SODIUM LACTATE, POTASSIUM CHLORIDE, AND CALCIUM CHLORIDE 1000 ML: 600; 310; 30; 20 INJECTION, SOLUTION INTRAVENOUS at 07:48

## 2018-07-09 RX ADMIN — ACETAMINOPHEN 1000 MG: 10 INJECTION, SOLUTION INTRAVENOUS at 08:30

## 2018-07-09 RX ADMIN — Medication 2 G: at 07:48

## 2018-07-09 RX ADMIN — KETOROLAC TROMETHAMINE 30 MG: 30 INJECTION, SOLUTION INTRAMUSCULAR at 13:12

## 2018-07-09 RX ADMIN — SODIUM CHLORIDE, SODIUM LACTATE, POTASSIUM CHLORIDE, AND CALCIUM CHLORIDE 1000 ML: 600; 310; 30; 20 INJECTION, SOLUTION INTRAVENOUS at 06:40

## 2018-07-09 RX ADMIN — SODIUM CHLORIDE, SODIUM LACTATE, POTASSIUM CHLORIDE, AND CALCIUM CHLORIDE: 600; 310; 30; 20 INJECTION, SOLUTION INTRAVENOUS at 07:52

## 2018-07-09 RX ADMIN — EPHEDRINE SULFATE 5 MG: 50 INJECTION, SOLUTION INTRAVENOUS at 08:20

## 2018-07-09 RX ADMIN — BUPIVACAINE HYDROCHLORIDE 11.15 MG: 7.5 INJECTION, SOLUTION EPIDURAL; RETROBULBAR at 08:07

## 2018-07-09 RX ADMIN — ONDANSETRON 4 MG: 2 INJECTION INTRAMUSCULAR; INTRAVENOUS at 08:10

## 2018-07-09 RX ADMIN — Medication 120 MCG: at 08:08

## 2018-07-09 NOTE — IP AVS SNAPSHOT
Summary of Care Report The Summary of Care report has been created to help improve care coordination. Users with access to [employer] or [address] (Web-based application) may access additional patient information including the Discharge Summary. If you are not currently a [address] user and need more information, please call the number listed below in the [place] [address] section and ask to be connected with Medical Records. Facility Information Name Address Phone [address] [address] [phone] Patient Information Patient Name Sex  Virginia Diaz ([id number]) Female 1995 Discharge Information Admitting Provider Service Area Unit Rashad Chicas MD / [address] Mother Infant / [phone] Discharge Provider Discharge Date/Time Discharge Disposition Destination (none) 2018 (Pending) AHR (none) Patient Language Language ENGLISH [13] Hospital Problems as of 2018  Reviewed: 2015 10:34 AM by Shukri Cardenas MD  
 None Non-Hospital Problems as of 2018  Reviewed: 2015 10:34 AM by Shukri Cardenas MD  
  
  
  
 Class Noted - Resolved Last Modified Active Problems Cholecystitis with cholelithiasis  2015 - Present 2015 by Shukri Cardenas MD  
  Entered by Shukri Cardenas MD  
  Gallstone pancreatitis  2015 - Present 2015 by Shukri Cardenas MD  
  Entered by Shukri Cardenas MD  
  Delivery by elective  section  2016 - Present 2016 by Maday Zhou MD  
  Entered by Maday Zhou MD  
  
You are allergic to the following No active allergies Current Discharge Medication List  
  
START taking these medications Dose & Instructions Dispensing Information Comments  
 docusate sodium 100 mg capsule Commonly known as:  Lester Wicho Dose:  100 mg Take 1 Cap by mouth two (2) times a day for 90 days. Quantity:  60 Cap Refills:  2  
   
 ibuprofen 600 mg tablet Commonly known as:  MOTRIN Dose:  600 mg Take 1 Tab by mouth every six (6) hours as needed for Pain for up to 360 days. Quantity:  30 Tab Refills:  0  
   
 iron, carbonyl 45 mg Tab Commonly known as:  Sheila Diones Dose:  1 Tab Take 1 Tab by mouth daily. Quantity:  30 Tab Refills:  1  
   
 oxyCODONE-acetaminophen 5-325 mg per tablet Commonly known as:  PERCOCET Dose:  1-2 Tab Take 1-2 Tabs by mouth every four (4) hours as needed for Pain. Max Daily Amount: 12 Tabs. Quantity:  30 Tab Refills:  0 CONTINUE these medications which have NOT CHANGED Dose & Instructions Dispensing Information Comments PRENATAL PO Take  by mouth. Refills:  0 Surgery Information ID Date/Time Status Primary Surgeon All Procedures Location [id number] 2018 [id number] [id number] [facility], MD  SECTION [facility] L[facility] OR Follow-up Information Follow up With Details Comments Contact Info Anish Fox MD     
  
 
Discharge Instructions POST DELIVERY DISCHARGE INSTRUCTIONS Name: Flash Jenkins YOB: 1995 Primary Diagnosis: Active Problems: * No active hospital problems. * General:  
 
Diet/Diet Restrictions: 
· Eight 8-ounce glasses of fluid daily (water, juices); avoid excessive caffeine intake. · Meals/snacks as desired which are high in fiber and carbohydrates and low in fat and cholesterol. Medications:  
 
 
 
Physical Activity / Restrictions / Safety: · Avoid heavy lifting, no more that 8 lbs. For 2-3 weeks;  
· Limit use of stairs to 2 times daily for the first week home. · No driving for one week. · Avoid intercourse 4-6 weeks, no douching or tampon use.   
· Check with obstetrician before starting or resuming an exercise program.   
 Discharge Instructions/Special Treatment/Home Care Needs:  
 
· Continue prenatal vitamins. · Continue to use squirt bottle with warm water on your episiotomy after each bathroom use until bleeding stops. · If steri-strips applied to your incision, remove in 7-10 days. Call your doctor for the following: · Fever over 101 degrees by mouth. · Vaginal bleeding heavier than a normal menstrual period or clots larger than a golf ball. · Red streaks or increased swelling of legs, painful red streaks on your breast. 
· Painful urination, constipation and increased pain or swelling or discharge with your incision. · If you feel extremely anxious or overwhelmed. · If you have thoughts of harming yourself and/or your baby. Pain Management:  
 
· Take Acetaminophen (Tylenol) or Ibuprofen (Advil, Motrin), as directed for pain. · Use a warm Sitz bath 3 times daily to relieve episiotomy or hemorrhoidal discomfort. · For hemorrhoidal discomfort, use Tucks and Anusol cream as needed and directed. · Heating pad to  incision as needed. Follow-Up Care:  
 
Appointment with MD: Follow-up Appointments Procedures  FOLLOW UP VISIT Appointment in: 6 Weeks With Dr. Jaida Simmons for postpartum check With Dr. Jaida Simmons for postpartum check Standing Status:   Standing Number of Occurrences:   1 Order Specific Question:   Appointment in Answer:   6 Weeks Telephone number: [phone] Signed By: Dee Queen MD                                                                                                   Date: 2018 Time: 9:30 AM 
 
 
Chart Review Routing History Recipient Method Report Sent By Ely Marques MD  
Fax: [phone] Phone: [phone] Fax [facility] MD NOTES AUTO ROUTING REPORT MD Surendra Burns 2015  4:50 PM 2015 Sami Marques MD  
Fax: [phone] Phone: [phone] Fax Nargis Tyson MD NOTES AUTO ROUTING REPORT Raz Coronado MD [[id number]] 6/12/2015  9:53 AM 06/12/2015 Thanh Jackson MD  
Fax: [phone] Phone: [phone] Fax Notes/Transcriptions Jayden Welsh RN [[id number]] 7/2/2015 11:44 AM 07/02/2015 Notes/Transcriptions Jayden Welsh RN [[id number]] 7/2/2015 11:44 AM 07/01/2015 Notes/Transcriptions Jayden Welsh RN [[id number]] 7/2/2015 11:44 AM 06/30/2015 Braden Gil MD [[id number]] 2/1/2012  4:22 AM 02/01/2012 Thanh Jackson MD  
Fax: [phone] Phone: [phone] Fax Nargis Tyson MD NOTES AUTO ROUTING REPORT MD Jessica Caballero 6/7/2016  9:03 AM 06/07/2016 Thanh Jackson MD  
Fax: [phone] Phone: [phone] Fax Nargis Tyson MD NOTES AUTO ROUTING REPORT MD Jessica Caballero 6/7/2016  9:04 AM 06/07/2016 Thanh Jackson MD  
Fax: [phone] Phone: [phone] Fax Nargis Tyson MD NOTES AUTO ROUTING REPORT David Conley MD [[id number]] 6/10/2016  9:03 AM 06/10/2016

## 2018-07-09 NOTE — ROUTINE PROCESS
Bedside shift change report given to TRISTAN Delgado (oncoming nurse) by KELVIN Haddad (offgoing nurse). Report included the following information SBAR, Procedure Summary, Intake/Output, MAR and Recent Results.

## 2018-07-09 NOTE — PROGRESS NOTES
1100- report given to haily cody rn. Patient resting in bed, bonding with baby, no needs at this time.

## 2018-07-09 NOTE — L&D DELIVERY NOTE
Delivery Summary  Patient: Cony Laurent             Circumcision:   desires  Additional Delivery Comments - scheduled repeat c/s at 39w2d. Baby boy \"joya\"      Information for the patient's :  Carlie Henson [[id number]]       Labor Events:    Labor: No   Rupture Date:     Rupture Time:     Rupture Type Intact   Amniotic Fluid Volume:      Amniotic Fluid Description:       Induction: None       Augmentation: None   Labor Events: None     Cervical Ripening:     None     Delivery Events:  Episiotomy: None   Laceration(s): None     Repaired: None    Number of Repair Packets:     Suture Type and Size: None     Estimated Blood Loss (ml): 800ml       Delivery Date: 2018    Delivery Time: 8:23 AM  Delivery Type: , Low Transverse  Sex:  Male     Gestational Age: 44w2d   Delivery Clinician:  Nacho Tao  Living Status: Living   Delivery Location: L&D OR           APGARS  One minute Five minutes Ten minutes   Skin color: 1   1        Heart rate: 2   2        Grimace: 2   2        Muscle tone: 2   2        Breathin   2        Totals: 9   9            Presentation: Vertex    Position:        Resuscitation Method:  Suctioning-bulb; Tactile Stimulation     Meconium Stained: None      Cord Vessels: 3 Vessels      Cord Events:    Cord Blood Sent?:  No    Blood Gases Sent?:  No    Placenta:  Date/Time:   8:24 AM  Removal: Expressed      Appearance: Intact     [place] Measurements:  Birth Weight: 3.44 kg      Birth Length: 53.3 cm      Head Circumference: 34.5 cm      Chest Circumference: 34.5 cm     Abdominal Girth: 31 cm    Other Providers:   JACOB TAO;JACK SORENSEN;ROMANA FRENCH;;;LOBO ROBLES;;;;;LOUIS TUBBS;VINCENT BORJA;FARIDEH GRANT, Obstetrician;Primary Nurse;Primary [place] Nurse;Nicu Nurse;Neonatologist;Anesthesiologist;Crna;Nurse Practitioner;Midwife;Nursery Nurse;Staff Nurse;Scrub Tech;Scrub Tech           Cord pH: none    Episiotomy: None   Laceration(s): None     Estimated Blood Loss (ml): 800    Labor Events  Method: None      Augmentation: None   Cervical Ripening:       None        Hospital Problems  Date Reviewed: 2015    None          Operative Vaginal Delivery - none    Group B Strep:   Lab Results   Component Value Date/Time    GrBStrep, External Negative 2016     Information for the patient's :  [place]  [[id number]]   No results found for: ABORH, PCTABR, PCTDIG, BILI, ABORHEXT, ABORH    No results found for: APH, APCO2, APO2, AHCO3, ABEC, ABDC, O2ST, EPHV, PCO2V, PO2V, HCO3V, EBEV, EBDV, SITE, RSCOM

## 2018-07-09 NOTE — H&P
EDC:2018  EGA: 39 weeks, 1 days      Primary Provider:  Jesus Martin MD    CC:  RCS. History of Present Illness:  25 yo  @ 39w2d presents for scheduled repeat c/s  preg c/b:  - prior c/s x 2        Patient's Prenatal Care with Doctor of Record Stephani Jack MD Notable For -    Leukorrhea  *Note: Repeat  [address]0025 [facility]  *Note: likekly mirena PP  Prev LTCS x2 desires repeat  Normal pregnancy multigravida, BOY, \"name secret\"   lab screening          Impression & Recommendations:    Problem # 1:  Prev LTCS x2 desires repeat (SRIRAM-594.34) (ZVL90-N81.211)  Plan repeat Low Transverse  Section  cbc, type and screen  ancef 2 gm  scds  reviewed consent. discussed risks of bleeding, infection, damage to surrounding structures including bowel, bladder, blood vessels, nerves, uterus, fallopian tubes, ureter, need for additional procedures including hysterectomy, dvt     Other Orders:  Future Orders:  Inpatient Admission - Medium (CPT-AdmitF) . .. 2018      Past Pregnancy History      :  4     Term Births:  2     Premature Births: 0     Living Children: 2     Para:   2     Mult. Births:  0     Prev : 2     Prev.  attempt? 0     Aborta:  1     Elect. Ab:  0     Spont.  Ab:  1     Ectopics:  0    Pregnancy # 1     Delivery date:   2015     Weeks Gestation: 40.6      labor:   no     Delivery type:   LTCS     Anesthesia type:   epidural     Delivery location:   [facility]     Infant Sex:  Female     Birth weight:  7lbs 9oz     Name:  Araceli Maravilla     Comments:  Danny Trivedi - Apgars 9/9, non reassuring fetal surveillance , failure to progress in labor, failed induction, post dates, Superficial laceration to fetal right cheek    Pregnancy # 2     Delivery date:   2016     Weeks Gestation: 39     Delivery type:   RLTCS     Delivery location:   [facility]     Infant Sex:  Male     Name:  Connor Capone     Comments:  Ivania - Apgars 9/9, scheduled RLTCS, short interval pregnancy, nuchal cord x2, uterine inertia, left uterine artery laceration. Desires circ. Pregnancy # 3     Comments:  SAB    Pregnancy # 4     Comments:  current        Past Medical History:     Reviewed history from 2018 and no changes required:        Pancreatitis 2015        SABx1    Past Surgical History:     Reviewed history from 2018 and no changes required:        right hip surgery x2 (Collapse of right labrum) - shaved down ball of joint, then second surgery for bone spur        Low Transverse  Section female [id number] [facility]        laparoscopic cholecystectomy 2015        [id number] RLTCS Male Dr. Areli Ferrer    Family History Summary:      Reviewed history Last on 2018 and no changes required:2018  Uncle - Has Family History of Diabetes - Entered On: 10/14/2014  MGF - Has Family History of Diabetes - Entered On: 10/14/2014    General Comments - FH:  Family history transferred to [facility]     Social History:     Reviewed history from 2018 and no changes required:                Stays home with her kids                 Smoking History:        Patient has never smoked. Review of Systems        See HPI    Except as noted in the HPI, the review of systems is negative for General, Breast, , Resp, GI, Endo, MS, Psych and Heme. Allergies      Medications Removed from Medication List        Flowsheet View for Follow-up Visit     Estimated weeks of        gestation:  39 1/7     Weight:  169.5     Blood pressure: 107 / 74     FHR:   130     Comment:  [facility]      Vital Signs    Blood Pressure: 107 / 74  Temperature:  98.1 deg.  F.  Pulse rate: 93 / minute    Contractions:  yes      NST:   reactive     Height:   62.5 inches  Weight:  169.5 pounds    BMI:   26 inches    Physical Exam     General           General appearance:  no acute distress    Head           Inspection:   normal    Eyes           External:   EOM intact    ENT           Dental:   adequate dentition    Extremeties           Extremeties:  0 edema    Psych           Orientation:  oriented to time, place, and person          Mood:  no appearance of anxiety, depression, or agitation    Lymph           Inguinal:  no inguinal adenopathy    Skin           Inspection:  no rashes, suspicious lesions, or ulcerations    Abdomen           Abdomen:  gravid    Pelvic Exam           EGBUS:  no lesions          Vagina:  normal appearing without lesions or discharge          Uterus:  gravid          Cervix:  no lesions or discharge            Impression & Recommendations:    Problem # 1:  Prev LTCS x2 desires repeat (TSQ-820.76) (YYB74-O56.211)  Plan repeat Low Transverse  Section  cbc, type and screen  ancef 2 gm  scds  reviewed consent. discussed risks of bleeding, infection, damage to surrounding structures including bowel, bladder, blood vessels, nerves, uterus, fallopian tubes, ureter, need for additional procedures including hysterectomy, dvt     Other Orders:  Future Orders:  Inpatient Admission - Medium (CPT-AdmitF) . .. 2018      Medications (at conclusion of this visit)    2018 PRENATAL VITAMINS TABLET (PRENATAL VIT-FE FUMARATE-FA TABS) Prescribed by non [facility] MD          LABORATORY DATA   TEST DATE RESULT   Group B Strep culture 2018 Negative                                   (Group B Strep Culture Result Field)   Blood Type 2018 O                                             (Blood Type Result Field)   Rh 2018 Positive                                   (Rh Result Field)   Rhogam Inj Given 2016 *   Tdap Vaccine Given 2018 Vacc.  [address]   Antibody Screen 2018 Negative   Rubella  [facility] Reference Ranges On or After 3/10/14                  <0.90              Non-immune      0.90 - 0.99     Equivocal      >0.99              Immune    [facility] Reference Ranges  Before 3/10/14           <5                 Non-immune 5 - 9               Equivocal            >9                 Immune  [facility] Reference Ranges       < Or = 0.90       Negative             0.91-1.09          Equivocal            > Or = 1.10       Positive   01/04/2018     2.13     TPA (T Pallidum Antibodies) 04/24/2018 Negative   Serology (RPR) 06/07/2016 *   HBsAg 01/04/2018 Negative   HIV 01/04/2018 Non Reactive   Hemoglobin 04/24/2018 11.6   Hematocrit 04/24/2018 35.5   Platelets 26/09/4502 256 X10E3/UL   TSH 06/07/2016 *   Urine Culture 01/04/2018 Negative   GC DNA Probe 01/04/2018 Negative   Chlamydia DNA 01/04/2018 Negative   PAP 02/20/2018 Deferred   Flu Vaccine Given 01/04/2018 declined   HGBA1C 06/07/2016 *   HGB Electro     T4, Free 06/07/2016 *   BG Fasting 06/07/2016 *   GTT 1H 50G 04/24/2018 125   GTT 1H 100G 06/07/2016 *   GTT 2H 100G 06/07/2016 *   GTT 3H 100G 06/07/2016 *   Glucose Plasma 06/07/2016 *   CF Accept or Decline 01/04/2018 declined   CF Screen Result 01/04/2018 Declined   Nuchal Trans 01/04/2018 declined   AFP Only 01/04/2018 Not Indicated   Tetra 02/01/2018 Declined   AFP Serum 06/07/2016 *   CVS 01/04/2018 declined   AFP Amniotic 06/07/2016 *   Amnio Karyo 01/04/2018 declined   FISH 06/07/2016 *   GC Culture 06/07/2016 *   Chlamydia Cult 06/07/2016 *   Ureaplasma     Mycoplasma     WBC 01/04/2018 8.1 X10E3/UL   RBC 01/04/2018 4.63 X10E6/UL   MCV 01/04/2018 83   MCH 01/04/2018 28.3   MCHC RBC 01/04/2018 34.0     ULTRASOUND DATA   TEST DATE RESULT   Estimated Fetal Weight 03/05/2018 108.03409466^479 g&grams                                     Weight % 03/05/2018 32^32% %&percent                                                STEWART 04/26/2016 13.390712                    BPP     Cervical Length (mm)             Electronically signed by Lavern Lira MD on 07/09/2018 at 7:55 AM    ________________________________________________________________________

## 2018-07-09 NOTE — ANESTHESIA POSTPROCEDURE EVALUATION
Post-Anesthesia Evaluation and Assessment    Patient: Ladonna Vaca MRN: [id number]  SSN: [id number]    YOB: 1995  Age: 26 y.o. Sex: female       Cardiovascular Function/Vital Signs  Visit Vitals    BP 94/56    Pulse 78    Temp 36.5 °C (97.7 °F)    Resp 14    Ht 5' 1\" (1.549 m)    Wt 77.1 kg (170 lb)    SpO2 100%    Breastfeeding Yes    BMI 32.12 kg/m2       Patient is status post spinal anesthesia for Procedure(s):   SECTION. Nausea/Vomiting: None    Postoperative hydration reviewed and adequate. Pain:  Pain Scale 1: Numeric (0 - 10) (18)  Pain Intensity 1: 0 (18)   Managed    Neurological Status:   Neuro (WDL): Within Defined Limits (18)   At baseline    Mental Status and Level of Consciousness: Arousable    Pulmonary Status:   O2 Device: Room air (18)   Adequate oxygenation and airway patent    Complications related to anesthesia: None    Post-anesthesia assessment completed.  No concerns    Signed By: Regulo Mccoy MD     2018

## 2018-07-09 NOTE — OP NOTES
Operative Note    Name: Wilber Cleary Record Number: [id number]   YOB: 1995  Today's Date: 2018      Pre-operative Diagnosis: Prior LTCS x 2, IUP at 39w2d    Post-operative Diagnosis: same    Operation: Repeat Low Transverse  Section     Surgeon(s):  Berna Granado MD    Anesthesia: Spinal    Prophylactic Antibiotics: Ancef  DVT Prophylaxis: Sequential Compression Devices         Fetal Description: watts     Birth Information:   Information for the patient's :  Piyush Gonzalez [[id number]]   Delivery of a 3.44 kg Male [2] infant on 2018 at 8:23 AM. Apgars were 9 and 9. Umbilical Cord:     Umbilical Cord Events:     Placenta:  removal with  appearance. Amniotic Fluid Volume:        Amniotic Fluid Description:             Placenta:  Expressed    Estimated Blood Loss (ml):      Specimens: None           Complications:  None    Procedure Detail:      After proper patient identification and consent, the patient was taken to the operating room, where spinal anesthesia was administered and found to be adequate. Kim catheter had been placed using sterile technique. The patient was prepped and draped in the normal sterile fashion. A low transverse skin incision was made and carried down to the underlying fascia. The fascia was nicked in the midline and the fascial incision was then extended sharply. The superior edge of the fascial incision was then grasped and the underlying rectus muscles dissected off sharply. There were moderate adhesions of the rectus to the fascia. The peritoneum was then identified and entered bluntly and this incision was extended with stretch. There were no intra-abdominal adhesions noted. A bladder flap was not created. A low transverse uterine incision was made with the scalpel  and extended with blunt finger dissection. Amniotomy was performed and the fluid was medium amount clear.   The babys head was then delivered atraumatically. The cord was clamped and cut and the baby was handed off to Nursing staff in attendance. Placenta was then removed from the uterus. The uterus was curettaged with a moist lap pad and cleared of all clots and debris. The uterine incision was closed with 0 monocryl  in running locking fashion, followed by a second imbricating layer of 0 monocryl running stitch. There was noted to be a hematoma along the lower edge of the incision that was made hemostatic with two figure of 8 sutures of 0 monocryl. Gagandeep was then placed over the hysterotomy and along the bladder flap. Good good hemostasis was assured. There was some oozing on the rectus muscles that was made hemostatic with cautery and gagandeep. The fascia was closed with 0- Vicryl in a running fashion. Good hemostasis was assured. The subcutaneous tissue was closed with 2-0 plain gut in a running fashion. The skin was closed with Insorb subcuticular staple closure. The patient tolerated the procedure well. Sponge, lap, and needle counts were correct times three and the patient and baby were taken to recovery/postpartum room in stable condition.     Waldo Cardenas MD  July 9, 2018  9:15 AM

## 2018-07-09 NOTE — IP AVS SNAPSHOT
[address] [facility] 
[phone] Patient: Parish Luna MRN: [id number] :1995 About your hospitalization You were admitted on:  2018 You last received care in the:  [facility] 3 MOTHER INFANT You were discharged on:  2018 Why you were hospitalized Your primary diagnosis was:  Not on File Follow-up Information Follow up With Details Comments Contact Info Bonnie Snyder MD     
  
Discharge Orders None A check adolfo indicates which time of day the medication should be taken. My Medications START taking these medications Instructions Each Dose to Equal  
 Morning Noon Evening Bedtime  
 docusate sodium 100 mg capsule Commonly known as:  Nestor Quinteros Your last dose was: Your next dose is: Take 1 Cap by mouth two (2) times a day for 90 days. 100 mg  
    
   
   
   
  
 ibuprofen 600 mg tablet Commonly known as:  MOTRIN Your last dose was: Your next dose is: Take 1 Tab by mouth every six (6) hours as needed for Pain for up to 360 days. 600 mg  
    
   
   
   
  
 iron, carbonyl 45 mg Tab Commonly known as:  Ardelbrittany Plasencias Your last dose was: Your next dose is: Take 1 Tab by mouth daily. 1 Tab  
    
   
   
   
  
 oxyCODONE-acetaminophen 5-325 mg per tablet Commonly known as:  PERCOCET Your last dose was: Your next dose is: Take 1-2 Tabs by mouth every four (4) hours as needed for Pain. Max Daily Amount: 12 Tabs. 1-2 Tab CONTINUE taking these medications Instructions Each Dose to Equal  
 Morning Noon Evening Bedtime PRENATAL PO Your last dose was: Your next dose is: Take  by mouth. Where to Get Your Medications Information on where to get these meds will be given to you by the nurse or doctor. ! Ask your nurse or doctor about these medications  
  docusate sodium 100 mg capsule  
 ibuprofen 600 mg tablet  
 iron, carbonyl 45 mg Tab  
 oxyCODONE-acetaminophen 5-325 mg per tablet Opioid Education Prescription Opioids: What You Need to Know: 
 
 
Diet/Diet Restrictions: 
· Eight 8-ounce glasses of fluid daily (water, juices); avoid excessive caffeine intake. · Meals/snacks as desired which are high in fiber and carbohydrates and low in fat and cholesterol. Medications:  
 
 
 
Physical Activity / Restrictions / Safety: · Avoid heavy lifting, no more that 8 lbs. For 2-3 weeks;  
· Limit use of stairs to 2 times daily for the first week home. · No driving for one week. · Avoid intercourse 4-6 weeks, no douching or tampon use. · Check with obstetrician before starting or resuming an exercise program.   
 
Discharge Instructions/Special Treatment/Home Care Needs:  
 
· Continue prenatal vitamins. · Continue to use squirt bottle with warm water on your episiotomy after each bathroom use until bleeding stops. · If steri-strips applied to your incision, remove in 7-10 days. Call your doctor for the following: · Fever over 101 degrees by mouth. · Vaginal bleeding heavier than a normal menstrual period or clots larger than a golf ball. · Red streaks or increased swelling of legs, painful red streaks on your breast. 
· Painful urination, constipation and increased pain or swelling or discharge with your incision. · If you feel extremely anxious or overwhelmed. · If you have thoughts of harming yourself and/or your baby. Pain Management:  
 
· Take Acetaminophen (Tylenol) or Ibuprofen (Advil, Motrin), as directed for pain. · Use a warm Sitz bath 3 times daily to relieve episiotomy or hemorrhoidal discomfort. · For hemorrhoidal discomfort, use Tucks and Anusol cream as needed and directed. · Heating pad to  incision as needed. Follow-Up Care:  
 
Appointment with MD: Follow-up Appointments Procedures  FOLLOW UP VISIT Appointment in: 6 Weeks With Dr. Karlene Alva for postpartum check With Dr. Karlene Alva for postpartum check Standing Status:   Standing Number of Occurrences:   1 Order Specific Question:   Appointment in Answer:   6 Weeks Telephone number: [phone] Signed By: Mo Mares MD                                                                                                   Date: 2018 Time: 9:30 AM 
 
 
  
  
  
[email or web address] Announcement We are excited to announce that we are making your provider's discharge notes available to you in [email or web address]. You will see these notes when they are completed and signed by the physician that discharged you from your recent hospital stay. If you have any questions or concerns about any information you see in Drugstore.com, please call the Health Information Department where you were seen or reach out to your Primary Care Provider for more information about your plan of care. Introducing [facility]! [facility] introduces Drugstore.com patient portal. Now you can access parts of your medical record, email your doctor's office, and request medication refills online. 1. In your internet browser, go to [email or web address] 2. Click on the First Time User? Click Here link in the Sign In box. You will see the New Member Sign Up page. 3. Enter your Drugstore.com Access Code exactly as it appears below.  You will not need to use this code after youve completed the sign-up process. If you do not sign up before the expiration date, you must request a new code. · [employer] Access Code: [email or web address] Expires: 10/9/2018  9:43 AM 
 
4. Enter the last four digits of your Social Security Number (xxxx) and Date of Birth (mm/dd/yyyy) as indicated and click Submit. You will be taken to the next sign-up page. 5. Create a Sabakat ID. This will be your Sabakat login ID and cannot be changed, so think of one that is secure and easy to remember. 6. Create a Sabakat password. You can change your password at any time. 7. Enter your Password Reset Question and Answer. This can be used at a later time if you forget your password. 8. Enter your e-mail address. You will receive e-mail notification when new information is available in [address] 9. Click Sign Up. You can now view and download portions of your medical record. 10. Click the Download Summary menu link to download a portable copy of your medical information. If you have questions, please visit the Frequently Asked Questions section of the Sabakat website. Remember, Sabakat is NOT to be used for urgent needs. For medical emergencies, dial 911. Now available from your iPhone and Android! Introducing Angus Arce As a [facility] patient, I wanted to make you aware of our electronic visit tool called Angus Arce. [facility] 24/7 allows you to connect within minutes with a medical provider 24 hours a day, seven days a week via a mobile device or tablet or logging into a secure website from your computer. You can access Angus Arce from anywhere in the [place].  
 
A virtual visit might be right for you when you have a simple condition and feel like you just dont want to get out of bed, or cant get away from work for an appointment, when your regular [facility] provider is not available (evenings, weekends or holidays), or when youre out of town and need minor care. Electronic visits cost only $49 and if the [facility] 24/7 provider determines a prescription is needed to treat your condition, one can be electronically transmitted to a nearby pharmacy*. Please take a moment to enroll today if you have not already done so. The enrollment process is free and takes just a few minutes. To enroll, please download the Digital Folio cindi to your tablet or phone, or visit [email or web address] to enroll on your computer. And, as an [address] [place] [address] patient with a [employer] account, the results of your visits will be scanned into your electronic medical record and your primary care provider will be able to view the scanned results. We urge you to continue to see your regular [facility] provider for your ongoing medical care. And while your primary care provider may not be the one available when you seek a [employer]and[employer] virtual visit, the peace of mind you get from getting a real diagnosis real time can be priceless. For more information on [employer], view our Frequently Asked Questions (FAQs) at [email or web address]. Sincerely, 
 
Marissa Lopez MD 
Chief Medical Officer [address] [place] [address] *:  certain medications cannot be prescribed via [employer] Providers Seen During Your Hospitalization Provider Specialty Primary office phone Curt Moreira MD Obstetrics & Gynecology [phone] Your Primary Care Physician (PCP) Primary Care Physician Office Phone Office Fax HOLDEN HERBERT ** None ** ** None ** You are allergic to the following No active allergies Recent Documentation Height Weight Breastfeeding? BMI OB Status Smoking Status 1.549 m 77.1 kg Yes 32.12 kg/m2 Recent pregnancy Never Smoker Emergency Contacts Name Discharge Info Relation Home Work Mobile [address] CAREGIVER [3] Spouse [3] [phone]  Mother [14] [phone] Patient Belongings The following personal items are in your possession at time of discharge: 
  Dental Appliances: None  Visual Aid: None      Home Medications: None   Jewelry: None  Clothing: Dress, Footwear    Other Valuables: None  Personal Items Sent to Safe: none Please provide this summary of care documentation to your next provider. Signatures-by signing, you are acknowledging that this After Visit Summary has been reviewed with you and you have received a copy. Patient Signature:  ____________________________________________________________ Date:  ____________________________________________________________  
  
Rea Violet Provider Signature:  ____________________________________________________________ Date:  ____________________________________________________________

## 2018-07-09 NOTE — PROGRESS NOTES
Bedside and Verbal shift change report given to RN JESUS El and JOANNA Jamison (oncoming nurse) by Marcelino Stauffer RN  (offgoing nurse). Report given with Baislio RODRIGUEZ and MAR.

## 2018-07-09 NOTE — PROGRESS NOTES
TRANSFER - IN REPORT:    Verbal report received from Kenisha Peck RN(name) on Ezra Constantino  being received from L&D(unit) for routine post - op      Report consisted of patients Situation, Background, Assessment and   Recommendations(SBAR). Information from the following report(s) SBAR, OR Summary, Procedure Summary, Intake/Output, MAR and Recent Results was reviewed with the receiving nurse. Opportunity for questions and clarification was provided. Assessment completed upon patients arrival to unit and care assumed.

## 2018-07-09 NOTE — LACTATION NOTE
This note was copied from a baby's chart. Infant has been to breast twice in 4 hours since  birth with LATCH scores of 8 and 8. Parents are documenting feedings and output on breastfeeding log. Encourage responding to feeding cues as well as waking to feed every 2 to 3 hours if sleepy. Mom has large soft breast with [place] nipple which infant latches easily. Encourage deep latching to prevent nipple tenderness. Mom given lanolin for prevention of nipple tenderness. Mom nursed 2 older children for 2 weeks each stating she had difficulty with latch with her son. Mom also states she was using benadryl for sleeping with previous lactation and it dried up her milk. Mother however states she was pumping on to 2 ounces at a sitting every 3 hours. Explained that is a normal pumping. Encourage follow up with pediatrician with this infant if she ever feels she has a low supply and can be followed with a pre and post weight. Mother verbalizes she really wants this to work for a longer period of time. Mom has lactation resource number for discharge.

## 2018-07-09 NOTE — ANESTHESIA PROCEDURE NOTES
Spinal Block    Start time: 7/9/2018 7:57 AM  End time: 7/9/2018 8:06 AM  Performed by: Princess Osman by: Kalina Nuno: Indications: at surgeon's request and primary anesthetic  Preanesthetic Checklist: patient identified, risks and benefits discussed, anesthesia consent, site marked, patient being monitored and timeout performed      Spinal Block:   Patient Position:  Seated  Prep Region:  Lumbar  Prep: DuraPrep      Location:  L2-3  Technique:  Single shot  Local:  Lidocaine 2%  Local Dose (mL):  5    Needle:   Needle Type:  Pencil-tip  Needle Gauge:  25 G  Attempts:  1      Events: CSF confirmed and no blood with aspiration        Assessment:  Insertion:  Uncomplicated  Patient tolerance:  Patient tolerated the procedure well with no immediate complications  1.4 mL 8.58% Spinal Marcaine with dextrose + 0.5 mg Duramorph was deposited into the CSF.

## 2018-07-10 LAB
BASOPHILS # BLD: 0 K/UL (ref 0–0.1)
BASOPHILS NFR BLD: 0 % (ref 0–1)
DIFFERENTIAL METHOD BLD: ABNORMAL
EOSINOPHIL # BLD: 0.2 K/UL (ref 0–0.4)
EOSINOPHIL NFR BLD: 2 % (ref 0–7)
ERYTHROCYTE [DISTWIDTH] IN BLOOD BY AUTOMATED COUNT: 14.6 % (ref 11.5–14.5)
HCT VFR BLD AUTO: 26.7 % (ref 35–47)
HGB BLD-MCNC: 8.6 G/DL (ref 11.5–16)
IMM GRANULOCYTES # BLD: 0 K/UL (ref 0–0.04)
IMM GRANULOCYTES NFR BLD AUTO: 0 % (ref 0–0.5)
LYMPHOCYTES # BLD: 1.5 K/UL (ref 0.8–3.5)
LYMPHOCYTES NFR BLD: 20 % (ref 12–49)
MCH RBC QN AUTO: 26.6 PG (ref 26–34)
MCHC RBC AUTO-ENTMCNC: 32.2 G/DL (ref 30–36.5)
MCV RBC AUTO: 82.7 FL (ref 80–99)
MONOCYTES # BLD: 0.7 K/UL (ref 0–1)
MONOCYTES NFR BLD: 10 % (ref 5–13)
NEUTS SEG # BLD: 5.1 K/UL (ref 1.8–8)
NEUTS SEG NFR BLD: 67 % (ref 32–75)
NRBC # BLD: 0 K/UL (ref 0–0.01)
NRBC BLD-RTO: 0 PER 100 WBC
PLATELET # BLD AUTO: 172 K/UL (ref 150–400)
PMV BLD AUTO: 10.8 FL (ref 8.9–12.9)
RBC # BLD AUTO: 3.23 M/UL (ref 3.8–5.2)
WBC # BLD AUTO: 7.5 K/UL (ref 3.6–11)

## 2018-07-10 PROCEDURE — 65410000002 HC RM PRIVATE OB

## 2018-07-10 PROCEDURE — 85025 COMPLETE CBC W/AUTO DIFF WBC: CPT | Performed by: OBSTETRICS & GYNECOLOGY

## 2018-07-10 PROCEDURE — 36415 COLL VENOUS BLD VENIPUNCTURE: CPT | Performed by: OBSTETRICS & GYNECOLOGY

## 2018-07-10 PROCEDURE — 74011250637 HC RX REV CODE- 250/637: Performed by: OBSTETRICS & GYNECOLOGY

## 2018-07-10 RX ADMIN — OXYCODONE HYDROCHLORIDE AND ACETAMINOPHEN 2 TABLET: 5; 325 TABLET ORAL at 22:59

## 2018-07-10 RX ADMIN — OXYCODONE HYDROCHLORIDE AND ACETAMINOPHEN 2 TABLET: 5; 325 TABLET ORAL at 12:57

## 2018-07-10 RX ADMIN — DOCUSATE SODIUM 100 MG: 100 CAPSULE, LIQUID FILLED ORAL at 19:04

## 2018-07-10 RX ADMIN — IBUPROFEN 800 MG: 400 TABLET ORAL at 12:57

## 2018-07-10 RX ADMIN — IBUPROFEN 800 MG: 400 TABLET ORAL at 21:11

## 2018-07-10 RX ADMIN — IBUPROFEN 800 MG: 400 TABLET ORAL at 05:35

## 2018-07-10 RX ADMIN — DOCUSATE SODIUM 100 MG: 100 CAPSULE, LIQUID FILLED ORAL at 11:00

## 2018-07-10 RX ADMIN — OXYCODONE HYDROCHLORIDE AND ACETAMINOPHEN 1 TABLET: 5; 325 TABLET ORAL at 19:12

## 2018-07-10 NOTE — PROGRESS NOTES
Post-Operative  Day 1    Chantel Huberaney     Assessment: Post-Op day 1, stable    Plan:   1. Routine post-operative care   2. The risks and benefits of the circumcision  procedure and anesthesia including: bleeding, infection, variability of cosmetic results were discussed at length with the mother. She is aware that future repeat procedures may be necessary. She gives informed consent to proceed as noted and her questions are answered. Information for the patient's :  Nitesh Hernandez [[id number]]   , Low Transverse   Patient doing well without significant complaint. Nausea and vomiting resolved, tolerating liquids, no flatus, faustin in place. Vitals:  Visit Vitals    BP (!) 88/51 (BP 1 Location: Right arm, BP Patient Position: At rest)  Comment: RN notified    Pulse 77    Temp 97.8 °F (36.6 °C)    Resp 18    Ht 5' 1\" (1.549 m)    Wt 77.1 kg (170 lb)    SpO2 100%    Breastfeeding Yes    BMI 32.12 kg/m2     Temp (24hrs), Av.9 °F (36.6 °C), Min:97.5 °F (36.4 °C), Max:98 °F (36.7 °C)      Last 24hr Input/Output:    Intake/Output Summary (Last 24 hours) at 07/10/18 0922  Last data filed at 07/10/18 0720   Gross per 24 hour   Intake          1129.17 ml   Output             3700 ml   Net         -2570.83 ml          Exam:        Patient without distress. Lungs clear. Abdomen, bowel sounds present, soft, expected tenderness, fundus firm Wound dressing intact     Perineum normal lochia noted               Lower extremities are negative for swelling, cords or tenderness.     Labs:   Lab Results   Component Value Date/Time    WBC 7.5 07/10/2018 04:45 AM    WBC 11.2 (H) 2018 03:39 PM    WBC 7.7 2018 06:36 AM    WBC 8.0 2017 10:07 PM    WBC 10.0 2016 06:11 AM    WBC 7.0 2016 10:54 AM    WBC 5.5 2015 04:34 AM    WBC 7.1 2015 09:21 AM    WBC 12.8 (H) 2015 01:08 PM    WBC 12.7 (H) 06/10/2015 03:55 AM    WBC 8.0 2015 09:24 PM    WBC 10.1 (H) 02/18/2013 01:57 PM    HGB 8.6 (L) 07/10/2018 04:45 AM    HGB 9.6 (L) 07/09/2018 03:39 PM    HGB 11.3 (L) 07/09/2018 06:36 AM    HGB 12.3 01/24/2017 10:07 PM    HGB 8.9 (L) 06/08/2016 06:11 AM    HGB 10.9 (L) 06/06/2016 10:54 AM    HGB 9.8 (L) 07/01/2015 04:34 AM    HGB 11.2 (L) 06/30/2015 09:21 AM    HGB 11.3 (L) 06/23/2015 01:08 PM    HGB 8.1 (L) 06/10/2015 03:55 AM    HGB 11.5 06/08/2015 09:24 PM    HGB 13.6 (H) 02/18/2013 01:57 PM    HCT 26.7 (L) 07/10/2018 04:45 AM    HCT 29.9 (L) 07/09/2018 03:39 PM    HCT 35.0 07/09/2018 06:36 AM    HCT 37.9 01/24/2017 10:07 PM    HCT 27.4 (L) 06/08/2016 06:11 AM    HCT 33.7 (L) 06/06/2016 10:54 AM    HCT 31.1 (L) 07/01/2015 04:34 AM    HCT 35.3 06/30/2015 09:21 AM    HCT 36.2 06/23/2015 01:08 PM    HCT 25.2 (L) 06/10/2015 03:55 AM    HCT 34.3 (L) 06/08/2015 09:24 PM    HCT 41.4 (H) 02/18/2013 01:57 PM    PLATELET 910 62/00/7050 04:45 AM    PLATELET 433 51/48/9054 03:39 PM    PLATELET 338 04/18/5179 06:36 AM    PLATELET 109 58/55/1674 10:07 PM    PLATELET 756 57/10/1518 06:11 AM    PLATELET 752 44/13/5863 10:54 AM    PLATELET 748 78/59/6736 04:34 AM    PLATELET 145 86/02/3445 09:21 AM    PLATELET 740 (H) 35/95/9365 01:08 PM    PLATELET 681 35/18/9995 03:55 AM    PLATELET 191 14/09/9901 09:24 PM    PLATELET 411 (H) 09/77/6314 01:57 PM       Recent Results (from the past 24 hour(s))   CBC W/O DIFF    Collection Time: 07/09/18  3:39 PM   Result Value Ref Range    WBC 11.2 (H) 3.6 - 11.0 K/uL    RBC 3.62 (L) 3.80 - 5.20 M/uL    HGB 9.6 (L) 11.5 - 16.0 g/dL    HCT 29.9 (L) 35.0 - 47.0 %    MCV 82.6 80.0 - 99.0 FL    MCH 26.5 26.0 - 34.0 PG    MCHC 32.1 30.0 - 36.5 g/dL    RDW 14.6 (H) 11.5 - 14.5 %    PLATELET 818 224 - 586 K/uL    MPV 10.5 8.9 - 12.9 FL    NRBC 0.0 0  WBC    ABSOLUTE NRBC 0.00 0.00 - 0.01 K/uL   CBC WITH AUTOMATED DIFF    Collection Time: 07/10/18  4:45 AM   Result Value Ref Range    WBC 7.5 3.6 - 11.0 K/uL    RBC 3.23 (L) 3.80 - 5.20 M/uL    HGB 8.6 (L) 11.5 - 16.0 g/dL    HCT 26.7 (L) 35.0 - 47.0 %    MCV 82.7 80.0 - 99.0 FL    MCH 26.6 26.0 - 34.0 PG    MCHC 32.2 30.0 - 36.5 g/dL    RDW 14.6 (H) 11.5 - 14.5 %    PLATELET 207 373 - 094 K/uL    MPV 10.8 8.9 - 12.9 FL    NRBC 0.0 0  WBC    ABSOLUTE NRBC 0.00 0.00 - 0.01 K/uL    NEUTROPHILS 67 32 - 75 %    LYMPHOCYTES 20 12 - 49 %    MONOCYTES 10 5 - 13 %    EOSINOPHILS 2 0 - 7 %    BASOPHILS 0 0 - 1 %    IMMATURE GRANULOCYTES 0 0.0 - 0.5 %    ABS. NEUTROPHILS 5.1 1.8 - 8.0 K/UL    ABS. LYMPHOCYTES 1.5 0.8 - 3.5 K/UL    ABS. MONOCYTES 0.7 0.0 - 1.0 K/UL    ABS. EOSINOPHILS 0.2 0.0 - 0.4 K/UL    ABS. BASOPHILS 0.0 0.0 - 0.1 K/UL    ABS. IMM.  GRANS. 0.0 0.00 - 0.04 K/UL    DF AUTOMATED

## 2018-07-10 NOTE — PROGRESS NOTES
Bedside shift change report given to Perry Espinoza RN (oncoming nurse) by TRISTAN Cornell (offgoing nurse). Report included the following information SBAR.

## 2018-07-10 NOTE — PROGRESS NOTES
Duramorph Follow-Up Note    1 Day Post-Op sp Procedure(s):   SECTION. BP (!) 88/51 (BP 1 Location: Right arm, BP Patient Position: At rest) Comment: RN notified  Pulse 77  Temp 36.6 °C (97.8 °F)  Resp 18  Ht 5' 1\" (1.549 m)  Wt 77.1 kg (170 lb)  SpO2 100%  Breastfeeding? Yes  BMI 32.12 kg/m2. Patient is POD-1 S/P intrathecal duramorph. Pain is well controlled  Patient reports no headache, fever, weakness or numbness. Epidural/spinal tap site is clean, dry and intact. No obvious Anesthesia complications noted. Plan:    Pain management as per primary service.

## 2018-07-10 NOTE — PROGRESS NOTES
Bedside shift change report given to RASHARD Williamson RN (oncoming nurse) by CLEVELAND Alfonso RN (offgoing nurse). Report included the following information SBAR.

## 2018-07-10 NOTE — LACTATION NOTE
This note was copied from a baby's chart. Infant  10 times in 24 hours with 3 voids and 6 stools. LATCH scores are 8-7-9 and 6 for past 24 hours. Weight loss is -4.6%. Encourage continued frequency with nursing for lactogenesis and benefits to infant and mother.

## 2018-07-11 VITALS
TEMPERATURE: 97.5 F | OXYGEN SATURATION: 100 % | RESPIRATION RATE: 16 BRPM | HEART RATE: 69 BPM | DIASTOLIC BLOOD PRESSURE: 69 MMHG | WEIGHT: 170 LBS | SYSTOLIC BLOOD PRESSURE: 106 MMHG | HEIGHT: 61 IN | BODY MASS INDEX: 32.1 KG/M2

## 2018-07-11 PROCEDURE — 74011250637 HC RX REV CODE- 250/637: Performed by: OBSTETRICS & GYNECOLOGY

## 2018-07-11 RX ORDER — CYANOCOBALAMIN (VITAMIN B-12) 1000 MCG
1 TABLET, EXTENDED RELEASE ORAL DAILY
Qty: 30 TAB | Refills: 1 | Status: ON HOLD | OUTPATIENT
Start: 2018-07-11 | End: 2019-09-10

## 2018-07-11 RX ORDER — OXYCODONE AND ACETAMINOPHEN 5; 325 MG/1; MG/1
1-2 TABLET ORAL
Qty: 30 TAB | Refills: 0 | Status: ON HOLD | OUTPATIENT
Start: 2018-07-11 | End: 2019-09-10

## 2018-07-11 RX ORDER — DOCUSATE SODIUM 100 MG/1
100 CAPSULE, LIQUID FILLED ORAL 2 TIMES DAILY
Qty: 60 CAP | Refills: 2 | Status: SHIPPED | OUTPATIENT
Start: 2018-07-11 | End: 2018-10-09

## 2018-07-11 RX ORDER — IBUPROFEN 600 MG/1
600 TABLET ORAL
Qty: 30 TAB | Refills: 0 | Status: SHIPPED | OUTPATIENT
Start: 2018-07-11 | End: 2019-07-06

## 2018-07-11 RX ADMIN — OXYCODONE HYDROCHLORIDE AND ACETAMINOPHEN 2 TABLET: 5; 325 TABLET ORAL at 07:21

## 2018-07-11 RX ADMIN — OXYCODONE HYDROCHLORIDE AND ACETAMINOPHEN 2 TABLET: 5; 325 TABLET ORAL at 12:44

## 2018-07-11 RX ADMIN — OXYCODONE HYDROCHLORIDE AND ACETAMINOPHEN 2 TABLET: 5; 325 TABLET ORAL at 03:17

## 2018-07-11 RX ADMIN — IBUPROFEN 800 MG: 400 TABLET ORAL at 06:08

## 2018-07-11 RX ADMIN — DOCUSATE SODIUM 100 MG: 100 CAPSULE, LIQUID FILLED ORAL at 09:10

## 2018-07-11 NOTE — PROGRESS NOTES
Post-Operative  Day 2    Chantel Peterson       Assessment: Post-Op day 2, doing well and desiring discharge today    Plan:   1. Discharge home today  2. Follow up in office in 6 weeks with Av Pruett MD  3. Post partum activity/wound care advised, diet as tolerated  4. Discharge Medications: ibuprofen, percocet and medications prior to admission  5. Acute on chronic anemia- home on iron      Information for the patient's :  Magda Dias [[id number]]   , Low Transverse   Patient doing well without significant complaint. Tolerating diet, passing flatus, voiding and ambulating without difficulty    Vitals:  Visit Vitals    /69 (BP 1 Location: Right arm, BP Patient Position: At rest)    Pulse 69    Temp 97.5 °F (36.4 °C)    Resp 16    Ht 5' 1\" (1.549 m)    Wt 77.1 kg (170 lb)    SpO2 100%    Breastfeeding Yes    BMI 32.12 kg/m2     Temp (24hrs), Av.8 °F (36.6 °C), Min:97.5 °F (36.4 °C), Max:98.1 °F (36.7 °C)        Exam:        Patient without distress. Abdomen, bowel sounds present, soft, expected tenderness, fundus firm                Wound incision clean, dry and intact               Lower extremities are negative for swelling, cords or tenderness.     Labs:   Lab Results   Component Value Date/Time    WBC 7.5 07/10/2018 04:45 AM    WBC 11.2 (H) 2018 03:39 PM    WBC 7.7 2018 06:36 AM    WBC 8.0 2017 10:07 PM    WBC 10.0 2016 06:11 AM    WBC 7.0 2016 10:54 AM    WBC 5.5 2015 04:34 AM    WBC 7.1 2015 09:21 AM    WBC 12.8 (H) 2015 01:08 PM    WBC 12.7 (H) 06/10/2015 03:55 AM    WBC 8.0 2015 09:24 PM    WBC 10.1 (H) 2013 01:57 PM    HGB 8.6 (L) 07/10/2018 04:45 AM    HGB 9.6 (L) 2018 03:39 PM    HGB 11.3 (L) 2018 06:36 AM    HGB 12.3 2017 10:07 PM    HGB 8.9 (L) 2016 06:11 AM    HGB 10.9 (L) 2016 10:54 AM    HGB 9.8 (L) 2015 04:34 AM HGB 11.2 (L) 06/30/2015 09:21 AM    HGB 11.3 (L) 06/23/2015 01:08 PM    HGB 8.1 (L) 06/10/2015 03:55 AM    HGB 11.5 06/08/2015 09:24 PM    HGB 13.6 (H) 02/18/2013 01:57 PM    HCT 26.7 (L) 07/10/2018 04:45 AM    HCT 29.9 (L) 07/09/2018 03:39 PM    HCT 35.0 07/09/2018 06:36 AM    HCT 37.9 01/24/2017 10:07 PM    HCT 27.4 (L) 06/08/2016 06:11 AM    HCT 33.7 (L) 06/06/2016 10:54 AM    HCT 31.1 (L) 07/01/2015 04:34 AM    HCT 35.3 06/30/2015 09:21 AM    HCT 36.2 06/23/2015 01:08 PM    HCT 25.2 (L) 06/10/2015 03:55 AM    HCT 34.3 (L) 06/08/2015 09:24 PM    HCT 41.4 (H) 02/18/2013 01:57 PM    PLATELET 301 20/38/4515 04:45 AM    PLATELET 042 88/10/7337 03:39 PM    PLATELET 649 03/68/3050 06:36 AM    PLATELET 178 09/00/1111 10:07 PM    PLATELET 930 62/41/2763 06:11 AM    PLATELET 975 00/09/9296 10:54 AM    PLATELET 943 72/78/4581 04:34 AM    PLATELET 877 71/68/9249 09:21 AM    PLATELET 694 (H) 33/96/7777 01:08 PM    PLATELET 350 09/23/6294 03:55 AM    PLATELET 893 75/44/8682 09:24 PM    PLATELET 596 (H) 67/80/3990 01:57 PM       No results found for this or any previous visit (from the past 24 hour(s)).

## 2018-07-11 NOTE — ROUTINE PROCESS
Bedside and Verbal shift change report given to Christopher Alves RN (oncoming nurse) by Sandy Hou RN (offgoing nurse). Report included the following information SBAR.

## 2018-07-11 NOTE — PROGRESS NOTES
Bedside shift change report given to LISA Bolden RN (oncoming nurse) by [employer] (offgoing nurse). Report included the following information SBAR, Intake/Output, MAR and Recent Results.

## 2018-07-11 NOTE — DISCHARGE INSTRUCTIONS
POST DELIVERY DISCHARGE INSTRUCTIONS    Name: Kenia King  YOB: 1995  Primary Diagnosis: Active Problems:    * No active hospital problems. *      General:     Diet/Diet Restrictions:  · Eight 8-ounce glasses of fluid daily (water, juices); avoid excessive caffeine intake. · Meals/snacks as desired which are high in fiber and carbohydrates and low in fat and cholesterol. Medications:         Physical Activity / Restrictions / Safety:     · Avoid heavy lifting, no more that 8 lbs. For 2-3 weeks;   · Limit use of stairs to 2 times daily for the first week home. · No driving for one week. · Avoid intercourse 4-6 weeks, no douching or tampon use. · Check with obstetrician before starting or resuming an exercise program.      Discharge Instructions/Special Treatment/Home Care Needs:     · Continue prenatal vitamins. · Continue to use squirt bottle with warm water on your episiotomy after each bathroom use until bleeding stops. · If steri-strips applied to your incision, remove in 7-10 days. Call your doctor for the following:     · Fever over 101 degrees by mouth. · Vaginal bleeding heavier than a normal menstrual period or clots larger than a golf ball. · Red streaks or increased swelling of legs, painful red streaks on your breast.  · Painful urination, constipation and increased pain or swelling or discharge with your incision. · If you feel extremely anxious or overwhelmed. · If you have thoughts of harming yourself and/or your baby. Pain Management:     · Take Acetaminophen (Tylenol) or Ibuprofen (Advil, Motrin), as directed for pain. · Use a warm Sitz bath 3 times daily to relieve episiotomy or hemorrhoidal discomfort. · For hemorrhoidal discomfort, use Tucks and Anusol cream as needed and directed. · Heating pad to  incision as needed.      Follow-Up Care:     Appointment with MD:   Follow-up Appointments   Procedures    FOLLOW UP VISIT Appointment in: 6 Weeks With Dr. Patricia Erazo for postpartum check     With Dr. Patricia Erazo for postpartum check     Standing Status:   Standing     Number of Occurrences:   1     Order Specific Question:   Appointment in     Answer:   Jose C Gamboa     Telephone number: [phone]    Signed By: Vale Rodriguez MD                                                                                                   Date: 7/11/2018 Time: 9:30 AM

## 2018-07-11 NOTE — PROGRESS NOTES
Pt D/C home, discharge instructions and prescriptions given. Discharge education complete and pt verbalized understanding. No concerns.

## 2018-07-11 NOTE — DISCHARGE SUMMARY
Obstetrical Discharge Summary     Name: Luba Pyle MRN: [id number]  SSN: [id number]    YOB: 1995  Age: 26 y.o. Sex: female      Admit Date: 2018    Discharge Date: 2018     Admitting Physician: Rickey Barboza MD     Attending Physician:  Adarsh Goodwin MD     Admission Diagnoses: Repeat C/Section  Pregnancy    Discharge Diagnoses:   Information for the patient's :  Wen Carranza [[id number]]   Delivery of a 3.44 kg male infant via , Low Transverse on 2018 at 8:23 AM  by . Apgars were 9 and 9. Additional Diagnoses:   Hospital Problems  Date Reviewed: 2015    None         Lab Results   Component Value Date/Time    Rubella, External 2.13 immune 2018    GrBStrep, External negative 2018       Hospital Course: Normal hospital course following the delivery. Disposition at Discharge: Home or self care    Discharged Condition: Stable    Patient Instructions:   Current Discharge Medication List      START taking these medications    Details   ibuprofen (MOTRIN) 600 mg tablet Take 1 Tab by mouth every six (6) hours as needed for Pain for up to 360 days. Qty: 30 Tab, Refills: 0      oxyCODONE-acetaminophen (PERCOCET) 5-325 mg per tablet Take 1-2 Tabs by mouth every four (4) hours as needed for Pain. Max Daily Amount: 12 Tabs. Qty: 30 Tab, Refills: 0    Associated Diagnoses: Delivery by elective  section      docusate sodium (COLACE) 100 mg capsule Take 1 Cap by mouth two (2) times a day for 90 days. Qty: 60 Cap, Refills: 2      iron, carbonyl (FEOSOL) 45 mg tab Take 1 Tab by mouth daily. Qty: 30 Tab, Refills: 1         CONTINUE these medications which have NOT CHANGED    Details   PRENATAL VIT/IRON FUMARATE/FA (PRENATAL PO) Take  by mouth. Reference my discharge instructions.     Follow-up Appointments   Procedures    FOLLOW UP VISIT Appointment in: 6 Weeks With Dr. Nikky Doyle for postpartum check     With  [facility] for postpartum check     Standing Status:   Standing     Number of Occurrences:   1     Order Specific Question:   Appointment in     Answer:   6 Weeks        Signed By:  Buddy Aguirre MD     July 11, 2018

## 2019-02-12 LAB
CHLAMYDIA, EXTERNAL: NORMAL
HBSAG, EXTERNAL: NORMAL
HIV, EXTERNAL: NON REACTIVE
N. GONORRHEA, EXTERNAL: NORMAL
RUBELLA, EXTERNAL: NORMAL

## 2019-06-25 LAB — ANTIBODY SCREEN, EXTERNAL: NORMAL

## 2019-08-20 LAB — GRBS, EXTERNAL: NORMAL

## 2019-09-10 ENCOUNTER — ANESTHESIA (OUTPATIENT)
Dept: LABOR AND DELIVERY | Age: 24
End: 2019-09-10
Payer: COMMERCIAL

## 2019-09-10 ENCOUNTER — HOSPITAL ENCOUNTER (INPATIENT)
Age: 24
LOS: 2 days | Discharge: HOME | End: 2019-09-12
Attending: OBSTETRICS & GYNECOLOGY | Admitting: OBSTETRICS & GYNECOLOGY
Payer: COMMERCIAL

## 2019-09-10 ENCOUNTER — ANESTHESIA EVENT (OUTPATIENT)
Dept: LABOR AND DELIVERY | Age: 24
End: 2019-09-10
Payer: COMMERCIAL

## 2019-09-10 DIAGNOSIS — G89.18 POSTOPERATIVE PAIN: Primary | ICD-10-CM

## 2019-09-10 PROBLEM — Z34.90 NORMAL PREGNANCY: Status: ACTIVE | Noted: 2019-09-10

## 2019-09-10 LAB
ABO + RH BLD: NORMAL
BASOPHILS # BLD: 0 K/UL (ref 0–0.1)
BASOPHILS NFR BLD: 0 % (ref 0–1)
BLOOD GROUP ANTIBODIES SERPL: NORMAL
DIFFERENTIAL METHOD BLD: ABNORMAL
EOSINOPHIL # BLD: 0.1 K/UL (ref 0–0.4)
EOSINOPHIL NFR BLD: 1 % (ref 0–7)
ERYTHROCYTE [DISTWIDTH] IN BLOOD BY AUTOMATED COUNT: 15.1 % (ref 11.5–14.5)
HCT VFR BLD AUTO: 34.5 % (ref 35–47)
HGB BLD-MCNC: 10.9 G/DL (ref 11.5–16)
IMM GRANULOCYTES # BLD AUTO: 0 K/UL (ref 0–0.04)
IMM GRANULOCYTES NFR BLD AUTO: 1 % (ref 0–0.5)
LYMPHOCYTES # BLD: 1.9 K/UL (ref 0.8–3.5)
LYMPHOCYTES NFR BLD: 22 % (ref 12–49)
MCH RBC QN AUTO: 24.7 PG (ref 26–34)
MCHC RBC AUTO-ENTMCNC: 31.6 G/DL (ref 30–36.5)
MCV RBC AUTO: 78.2 FL (ref 80–99)
MONOCYTES # BLD: 0.8 K/UL (ref 0–1)
MONOCYTES NFR BLD: 9 % (ref 5–13)
NEUTS SEG # BLD: 6 K/UL (ref 1.8–8)
NEUTS SEG NFR BLD: 67 % (ref 32–75)
NRBC # BLD: 0 K/UL (ref 0–0.01)
NRBC BLD-RTO: 0 PER 100 WBC
PLATELET # BLD AUTO: 252 K/UL (ref 150–400)
PMV BLD AUTO: 11.1 FL (ref 8.9–12.9)
RBC # BLD AUTO: 4.41 M/UL (ref 3.8–5.2)
SPECIMEN EXP DATE BLD: NORMAL
WBC # BLD AUTO: 8.9 K/UL (ref 3.6–11)

## 2019-09-10 PROCEDURE — 74011250636 HC RX REV CODE- 250/636: Performed by: OBSTETRICS & GYNECOLOGY

## 2019-09-10 PROCEDURE — 75410000002 HC LABOR FEE PER 1 HR

## 2019-09-10 PROCEDURE — 74011250636 HC RX REV CODE- 250/636: Performed by: NURSE ANESTHETIST, CERTIFIED REGISTERED

## 2019-09-10 PROCEDURE — 77030003666 HC NDL SPINAL BD -A: Performed by: ANESTHESIOLOGY

## 2019-09-10 PROCEDURE — 74011250636 HC RX REV CODE- 250/636: Performed by: ANESTHESIOLOGY

## 2019-09-10 PROCEDURE — 77030002933 HC SUT MCRYL J&J -A

## 2019-09-10 PROCEDURE — 76060000032 HC ANESTHESIA 0.5 TO 1 HR: Performed by: OBSTETRICS & GYNECOLOGY

## 2019-09-10 PROCEDURE — 74011000250 HC RX REV CODE- 250: Performed by: NURSE ANESTHETIST, CERTIFIED REGISTERED

## 2019-09-10 PROCEDURE — 85025 COMPLETE CBC W/AUTO DIFF WBC: CPT

## 2019-09-10 PROCEDURE — 77030035236 HC SUT PDS STRATFX BARB J&J -B

## 2019-09-10 PROCEDURE — 36415 COLL VENOUS BLD VENIPUNCTURE: CPT

## 2019-09-10 PROCEDURE — 77010026065 HC OXYGEN MINIMUM MEDICAL AIR

## 2019-09-10 PROCEDURE — 74011250636 HC RX REV CODE- 250/636

## 2019-09-10 PROCEDURE — 75410000003 HC RECOV DEL/VAG/CSECN EA 0.5 HR

## 2019-09-10 PROCEDURE — 77030018836 HC SOL IRR NACL ICUM -A

## 2019-09-10 PROCEDURE — 77030018809 HC RETRCTR ALXSO DISP AMR -B

## 2019-09-10 PROCEDURE — 76060000078 HC EPIDURAL ANESTHESIA: Performed by: OBSTETRICS & GYNECOLOGY

## 2019-09-10 PROCEDURE — 76010000391 HC C SECN FIRST 1 HR: Performed by: OBSTETRICS & GYNECOLOGY

## 2019-09-10 PROCEDURE — 77030008459 HC STPLR SKN COOP -B

## 2019-09-10 PROCEDURE — 86900 BLOOD TYPING SEROLOGIC ABO: CPT

## 2019-09-10 PROCEDURE — 77030007866 HC KT SPN ANES BBMI -B: Performed by: ANESTHESIOLOGY

## 2019-09-10 PROCEDURE — 65410000002 HC RM PRIVATE OB

## 2019-09-10 PROCEDURE — 74011000250 HC RX REV CODE- 250

## 2019-09-10 RX ORDER — SIMETHICONE 80 MG
80 TABLET,CHEWABLE ORAL AS NEEDED
Status: DISCONTINUED | OUTPATIENT
Start: 2019-09-10 | End: 2019-09-12 | Stop reason: HOSPADM

## 2019-09-10 RX ORDER — ONDANSETRON 2 MG/ML
4 INJECTION INTRAMUSCULAR; INTRAVENOUS
Status: DISCONTINUED | OUTPATIENT
Start: 2019-09-10 | End: 2019-09-10

## 2019-09-10 RX ORDER — NALOXONE HYDROCHLORIDE 0.4 MG/ML
0.4 INJECTION, SOLUTION INTRAMUSCULAR; INTRAVENOUS; SUBCUTANEOUS AS NEEDED
Status: DISCONTINUED | OUTPATIENT
Start: 2019-09-10 | End: 2019-09-12 | Stop reason: HOSPADM

## 2019-09-10 RX ORDER — SODIUM CHLORIDE, SODIUM LACTATE, POTASSIUM CHLORIDE, CALCIUM CHLORIDE 600; 310; 30; 20 MG/100ML; MG/100ML; MG/100ML; MG/100ML
125 INJECTION, SOLUTION INTRAVENOUS CONTINUOUS
Status: DISCONTINUED | OUTPATIENT
Start: 2019-09-10 | End: 2019-09-12 | Stop reason: HOSPADM

## 2019-09-10 RX ORDER — KETOROLAC TROMETHAMINE 30 MG/ML
30 INJECTION, SOLUTION INTRAMUSCULAR; INTRAVENOUS
Status: DISCONTINUED | OUTPATIENT
Start: 2019-09-11 | End: 2019-09-12 | Stop reason: HOSPADM

## 2019-09-10 RX ORDER — DIPHENHYDRAMINE HYDROCHLORIDE 50 MG/ML
12.5 INJECTION, SOLUTION INTRAMUSCULAR; INTRAVENOUS
Status: DISCONTINUED | OUTPATIENT
Start: 2019-09-10 | End: 2019-09-12 | Stop reason: HOSPADM

## 2019-09-10 RX ORDER — ONDANSETRON 2 MG/ML
4 INJECTION INTRAMUSCULAR; INTRAVENOUS
Status: DISCONTINUED | OUTPATIENT
Start: 2019-09-10 | End: 2019-09-12 | Stop reason: HOSPADM

## 2019-09-10 RX ORDER — SODIUM CHLORIDE 0.9 % (FLUSH) 0.9 %
5-40 SYRINGE (ML) INJECTION EVERY 8 HOURS
Status: DISCONTINUED | OUTPATIENT
Start: 2019-09-10 | End: 2019-09-10 | Stop reason: HOSPADM

## 2019-09-10 RX ORDER — NALOXONE HYDROCHLORIDE 0.4 MG/ML
0.4 INJECTION, SOLUTION INTRAMUSCULAR; INTRAVENOUS; SUBCUTANEOUS AS NEEDED
Status: DISCONTINUED | OUTPATIENT
Start: 2019-09-10 | End: 2019-09-10

## 2019-09-10 RX ORDER — BUPIVACAINE HYDROCHLORIDE AND EPINEPHRINE 2.5; 5 MG/ML; UG/ML
INJECTION, SOLUTION EPIDURAL; INFILTRATION; INTRACAUDAL; PERINEURAL AS NEEDED
Status: DISCONTINUED | OUTPATIENT
Start: 2019-09-10 | End: 2019-09-10 | Stop reason: HOSPADM

## 2019-09-10 RX ORDER — CEFAZOLIN SODIUM/WATER 2 G/20 ML
2 SYRINGE (ML) INTRAVENOUS ONCE
Status: COMPLETED | OUTPATIENT
Start: 2019-09-10 | End: 2019-09-10

## 2019-09-10 RX ORDER — ACETAMINOPHEN 10 MG/ML
INJECTION, SOLUTION INTRAVENOUS
Status: DISCONTINUED
Start: 2019-09-10 | End: 2019-09-10

## 2019-09-10 RX ORDER — SODIUM CHLORIDE, SODIUM LACTATE, POTASSIUM CHLORIDE, CALCIUM CHLORIDE 600; 310; 30; 20 MG/100ML; MG/100ML; MG/100ML; MG/100ML
125 INJECTION, SOLUTION INTRAVENOUS CONTINUOUS
Status: DISCONTINUED | OUTPATIENT
Start: 2019-09-10 | End: 2019-09-10

## 2019-09-10 RX ORDER — OXYTOCIN/RINGER'S LACTATE 20/1000 ML
PLASTIC BAG, INJECTION (ML) INTRAVENOUS AS NEEDED
Status: DISCONTINUED | OUTPATIENT
Start: 2019-09-10 | End: 2019-09-10 | Stop reason: HOSPADM

## 2019-09-10 RX ORDER — OXYTOCIN/RINGER'S LACTATE 20/1000 ML
125-500 PLASTIC BAG, INJECTION (ML) INTRAVENOUS ONCE
Status: ACTIVE | OUTPATIENT
Start: 2019-09-10 | End: 2019-09-11

## 2019-09-10 RX ORDER — KETOROLAC TROMETHAMINE 30 MG/ML
30 INJECTION, SOLUTION INTRAMUSCULAR; INTRAVENOUS
Status: DISCONTINUED | OUTPATIENT
Start: 2019-09-10 | End: 2019-09-10

## 2019-09-10 RX ORDER — SODIUM CHLORIDE, SODIUM LACTATE, POTASSIUM CHLORIDE, CALCIUM CHLORIDE 600; 310; 30; 20 MG/100ML; MG/100ML; MG/100ML; MG/100ML
1000 INJECTION, SOLUTION INTRAVENOUS CONTINUOUS
Status: DISCONTINUED | OUTPATIENT
Start: 2019-09-10 | End: 2019-09-10 | Stop reason: HOSPADM

## 2019-09-10 RX ORDER — CEFAZOLIN SODIUM/WATER 2 G/20 ML
SYRINGE (ML) INTRAVENOUS
Status: COMPLETED
Start: 2019-09-10 | End: 2019-09-10

## 2019-09-10 RX ORDER — OXYCODONE AND ACETAMINOPHEN 5; 325 MG/1; MG/1
2 TABLET ORAL
Status: DISCONTINUED | OUTPATIENT
Start: 2019-09-10 | End: 2019-09-12 | Stop reason: HOSPADM

## 2019-09-10 RX ORDER — MORPHINE SULFATE 0.5 MG/ML
INJECTION, SOLUTION EPIDURAL; INTRATHECAL; INTRAVENOUS AS NEEDED
Status: DISCONTINUED | OUTPATIENT
Start: 2019-09-10 | End: 2019-09-10 | Stop reason: HOSPADM

## 2019-09-10 RX ORDER — IBUPROFEN 400 MG/1
800 TABLET ORAL EVERY 8 HOURS
Status: DISCONTINUED | OUTPATIENT
Start: 2019-09-11 | End: 2019-09-12 | Stop reason: HOSPADM

## 2019-09-10 RX ORDER — GLYCOPYRROLATE 0.2 MG/ML
INJECTION INTRAMUSCULAR; INTRAVENOUS AS NEEDED
Status: DISCONTINUED | OUTPATIENT
Start: 2019-09-10 | End: 2019-09-10 | Stop reason: HOSPADM

## 2019-09-10 RX ORDER — SODIUM CHLORIDE 0.9 % (FLUSH) 0.9 %
5-40 SYRINGE (ML) INJECTION AS NEEDED
Status: DISCONTINUED | OUTPATIENT
Start: 2019-09-10 | End: 2019-09-10 | Stop reason: HOSPADM

## 2019-09-10 RX ORDER — EPHEDRINE SULFATE 50 MG/ML
INJECTION, SOLUTION INTRAVENOUS AS NEEDED
Status: DISCONTINUED | OUTPATIENT
Start: 2019-09-10 | End: 2019-09-10 | Stop reason: HOSPADM

## 2019-09-10 RX ORDER — ONDANSETRON 2 MG/ML
INJECTION INTRAMUSCULAR; INTRAVENOUS AS NEEDED
Status: DISCONTINUED | OUTPATIENT
Start: 2019-09-10 | End: 2019-09-10 | Stop reason: HOSPADM

## 2019-09-10 RX ORDER — KETOROLAC TROMETHAMINE 30 MG/ML
INJECTION, SOLUTION INTRAMUSCULAR; INTRAVENOUS
Status: COMPLETED
Start: 2019-09-10 | End: 2019-09-10

## 2019-09-10 RX ORDER — BUPIVACAINE HYDROCHLORIDE AND EPINEPHRINE 2.5; 5 MG/ML; UG/ML
INJECTION, SOLUTION EPIDURAL; INFILTRATION; INTRACAUDAL; PERINEURAL AS NEEDED
Status: DISCONTINUED | OUTPATIENT
Start: 2019-09-10 | End: 2019-09-10

## 2019-09-10 RX ORDER — DOCUSATE SODIUM 100 MG/1
100 CAPSULE, LIQUID FILLED ORAL 2 TIMES DAILY
Status: DISCONTINUED | OUTPATIENT
Start: 2019-09-10 | End: 2019-09-12 | Stop reason: HOSPADM

## 2019-09-10 RX ORDER — SODIUM CHLORIDE 0.9 % (FLUSH) 0.9 %
5-40 SYRINGE (ML) INJECTION EVERY 8 HOURS
Status: DISCONTINUED | OUTPATIENT
Start: 2019-09-10 | End: 2019-09-12 | Stop reason: HOSPADM

## 2019-09-10 RX ORDER — SODIUM CHLORIDE 0.9 % (FLUSH) 0.9 %
5-40 SYRINGE (ML) INJECTION AS NEEDED
Status: DISCONTINUED | OUTPATIENT
Start: 2019-09-10 | End: 2019-09-12 | Stop reason: HOSPADM

## 2019-09-10 RX ORDER — DIPHENHYDRAMINE HYDROCHLORIDE 50 MG/ML
25-50 INJECTION, SOLUTION INTRAMUSCULAR; INTRAVENOUS
Status: DISCONTINUED | OUTPATIENT
Start: 2019-09-10 | End: 2019-09-12 | Stop reason: HOSPADM

## 2019-09-10 RX ORDER — SODIUM CHLORIDE, SODIUM LACTATE, POTASSIUM CHLORIDE, CALCIUM CHLORIDE 600; 310; 30; 20 MG/100ML; MG/100ML; MG/100ML; MG/100ML
INJECTION, SOLUTION INTRAVENOUS
Status: DISCONTINUED | OUTPATIENT
Start: 2019-09-10 | End: 2019-09-10 | Stop reason: HOSPADM

## 2019-09-10 RX ADMIN — EPHEDRINE SULFATE 5 MG: 50 INJECTION, SOLUTION INTRAVENOUS at 12:26

## 2019-09-10 RX ADMIN — PHENYLEPHRINE HYDROCHLORIDE 40 MCG: 10 INJECTION INTRAVENOUS at 12:36

## 2019-09-10 RX ADMIN — SODIUM CHLORIDE, SODIUM LACTATE, POTASSIUM CHLORIDE, AND CALCIUM CHLORIDE 125 ML/HR: 600; 310; 30; 20 INJECTION, SOLUTION INTRAVENOUS at 15:05

## 2019-09-10 RX ADMIN — PHENYLEPHRINE HYDROCHLORIDE 40 MCG: 10 INJECTION INTRAVENOUS at 12:40

## 2019-09-10 RX ADMIN — EPHEDRINE SULFATE 5 MG: 50 INJECTION, SOLUTION INTRAVENOUS at 12:57

## 2019-09-10 RX ADMIN — Medication 200 ML: at 12:58

## 2019-09-10 RX ADMIN — PHENYLEPHRINE HYDROCHLORIDE 40 MCG: 10 INJECTION INTRAVENOUS at 12:53

## 2019-09-10 RX ADMIN — EPHEDRINE SULFATE 5 MG: 50 INJECTION, SOLUTION INTRAVENOUS at 12:36

## 2019-09-10 RX ADMIN — GLYCOPYRROLATE 0.1 MG: 0.2 INJECTION, SOLUTION INTRAMUSCULAR; INTRAVENOUS at 12:58

## 2019-09-10 RX ADMIN — EPHEDRINE SULFATE 5 MG: 50 INJECTION, SOLUTION INTRAVENOUS at 12:43

## 2019-09-10 RX ADMIN — PHENYLEPHRINE HYDROCHLORIDE 80 MCG: 10 INJECTION INTRAVENOUS at 12:42

## 2019-09-10 RX ADMIN — PHENYLEPHRINE HYDROCHLORIDE 40 MCG: 10 INJECTION INTRAVENOUS at 12:26

## 2019-09-10 RX ADMIN — SODIUM CHLORIDE, SODIUM LACTATE, POTASSIUM CHLORIDE, AND CALCIUM CHLORIDE 125 ML/HR: 600; 310; 30; 20 INJECTION, SOLUTION INTRAVENOUS at 23:45

## 2019-09-10 RX ADMIN — PHENYLEPHRINE HYDROCHLORIDE 40 MCG: 10 INJECTION INTRAVENOUS at 12:50

## 2019-09-10 RX ADMIN — MORPHINE SULFATE 0.5 MG: 0.5 INJECTION, SOLUTION EPIDURAL; INTRATHECAL; INTRAVENOUS at 12:22

## 2019-09-10 RX ADMIN — KETOROLAC TROMETHAMINE 30 MG: 30 INJECTION, SOLUTION INTRAMUSCULAR at 15:33

## 2019-09-10 RX ADMIN — PHENYLEPHRINE HYDROCHLORIDE 40 MCG: 10 INJECTION INTRAVENOUS at 12:48

## 2019-09-10 RX ADMIN — EPHEDRINE SULFATE 5 MG: 50 INJECTION, SOLUTION INTRAVENOUS at 12:40

## 2019-09-10 RX ADMIN — PHENYLEPHRINE HYDROCHLORIDE 40 MCG: 10 INJECTION INTRAVENOUS at 12:32

## 2019-09-10 RX ADMIN — SODIUM CHLORIDE, SODIUM LACTATE, POTASSIUM CHLORIDE, AND CALCIUM CHLORIDE 999 ML/HR: 600; 310; 30; 20 INJECTION, SOLUTION INTRAVENOUS at 10:50

## 2019-09-10 RX ADMIN — Medication 2 G: at 12:11

## 2019-09-10 RX ADMIN — Medication 200 ML: at 12:39

## 2019-09-10 RX ADMIN — PHENYLEPHRINE HYDROCHLORIDE 40 MCG: 10 INJECTION INTRAVENOUS at 12:52

## 2019-09-10 RX ADMIN — EPHEDRINE SULFATE 5 MG: 50 INJECTION, SOLUTION INTRAVENOUS at 12:30

## 2019-09-10 RX ADMIN — KETOROLAC TROMETHAMINE 15 MG: 30 INJECTION, SOLUTION INTRAMUSCULAR at 22:32

## 2019-09-10 RX ADMIN — ONDANSETRON HYDROCHLORIDE 4 MG: 2 INJECTION, SOLUTION INTRAMUSCULAR; INTRAVENOUS at 12:27

## 2019-09-10 RX ADMIN — EPHEDRINE SULFATE 5 MG: 50 INJECTION, SOLUTION INTRAVENOUS at 12:51

## 2019-09-10 RX ADMIN — EPHEDRINE SULFATE 5 MG: 50 INJECTION, SOLUTION INTRAVENOUS at 12:48

## 2019-09-10 RX ADMIN — PHENYLEPHRINE HYDROCHLORIDE 80 MCG: 10 INJECTION INTRAVENOUS at 12:44

## 2019-09-10 RX ADMIN — EPHEDRINE SULFATE 5 MG: 50 INJECTION, SOLUTION INTRAVENOUS at 12:46

## 2019-09-10 RX ADMIN — PHENYLEPHRINE HYDROCHLORIDE 40 MCG: 10 INJECTION INTRAVENOUS at 12:38

## 2019-09-10 RX ADMIN — SODIUM CHLORIDE, POTASSIUM CHLORIDE, SODIUM LACTATE AND CALCIUM CHLORIDE: 600; 310; 30; 20 INJECTION, SOLUTION INTRAVENOUS at 12:13

## 2019-09-10 RX ADMIN — PHENYLEPHRINE HYDROCHLORIDE 40 MCG: 10 INJECTION INTRAVENOUS at 12:54

## 2019-09-10 RX ADMIN — PHENYLEPHRINE HYDROCHLORIDE 40 MCG: 10 INJECTION INTRAVENOUS at 12:34

## 2019-09-10 RX ADMIN — PHENYLEPHRINE HYDROCHLORIDE 40 MCG: 10 INJECTION INTRAVENOUS at 12:30

## 2019-09-10 RX ADMIN — PHENYLEPHRINE HYDROCHLORIDE 40 MCG: 10 INJECTION INTRAVENOUS at 12:56

## 2019-09-10 RX ADMIN — SODIUM CHLORIDE, SODIUM LACTATE, POTASSIUM CHLORIDE, AND CALCIUM CHLORIDE 1000 ML: 600; 310; 30; 20 INJECTION, SOLUTION INTRAVENOUS at 12:03

## 2019-09-10 RX ADMIN — EPHEDRINE SULFATE 5 MG: 50 INJECTION, SOLUTION INTRAVENOUS at 12:54

## 2019-09-10 RX ADMIN — PHENYLEPHRINE HYDROCHLORIDE 40 MCG: 10 INJECTION INTRAVENOUS at 12:28

## 2019-09-10 RX ADMIN — BUPIVACAINE HYDROCHLORIDE AND EPINEPHRINE 1.5 ML: 2.5; 5 INJECTION, SOLUTION EPIDURAL; INFILTRATION; INTRACAUDAL; PERINEURAL at 12:22

## 2019-09-10 RX ADMIN — PHENYLEPHRINE HYDROCHLORIDE 40 MCG: 10 INJECTION INTRAVENOUS at 12:23

## 2019-09-10 RX ADMIN — EPHEDRINE SULFATE 5 MG: 50 INJECTION, SOLUTION INTRAVENOUS at 12:32

## 2019-09-10 RX ADMIN — PHENYLEPHRINE HYDROCHLORIDE 40 MCG: 10 INJECTION INTRAVENOUS at 12:49

## 2019-09-10 RX ADMIN — PHENYLEPHRINE HYDROCHLORIDE 40 MCG: 10 INJECTION INTRAVENOUS at 12:46

## 2019-09-10 RX ADMIN — SODIUM CHLORIDE, SODIUM LACTATE, POTASSIUM CHLORIDE, AND CALCIUM CHLORIDE 999 ML/HR: 600; 310; 30; 20 INJECTION, SOLUTION INTRAVENOUS at 10:20

## 2019-09-10 NOTE — ANESTHESIA PROCEDURE NOTES
Spinal Block    Start time: 9/10/2019 12:17 PM  End time: 9/10/2019 12:22 PM  Performed by: Carroll Diaz MD  Authorized by: Carroll Diaz MD     Pre-procedure: Indications: primary anesthetic  Preanesthetic Checklist: risks and benefits discussed, site marked and timeout performed    Timeout Time: 12:17          Spinal Block:   Patient Position:  Seated  Prep Region:  Lumbar  Prep: DuraPrep      Location:  L3-4  Technique:  Single shot        Needle:   Needle Type:  Pencil-tip  Needle Gauge:  25 G  Attempts:  1      Events: CSF confirmed and no blood with aspiration        Assessment:  Insertion:  Uncomplicated  Patient tolerance:  Patient tolerated the procedure well with no immediate complications  1.5 mL 3.22% Spinal Marcaine with dextrose + 0.5 mg Duramorph was deposited into the CSF.

## 2019-09-10 NOTE — H&P
History & Physical    Name: Tian Johnson MRN: [id number]  SSN: [id number]    YOB: 1995  Age: 27 y.o. Sex: female      Subjective:     Estimated Date of Delivery: 19  OB History    Para Term  AB Living   4 3 3 0 0 3   SAB TAB Ectopic Molar Multiple Live Births   0 0 0   0 3      # Outcome Date GA Lbr Isidro/2nd Weight Sex Delivery Anes PTL Lv   4 Current            3 Term 18 39w2d  3.44 kg M CS-LTranv SPINAL AN N DYLAN   2 Term 16 39w0d  3.105 kg M  LO SPINAL AN N DYLAN   1 Term 06/09/15 40w6d  3.46 kg F  LO EPIDURAL AN, GENERAL AN N DYLAN      Complications: Dysfunctional Labor, Fetal Intolerance       Ms. Bernadette Juarez admitted with pregnancy at 39w0d for  section due to prior LTCS x 3. Prenatal course c/b:    - history of  section x3, plan repeat, placental location (posterior)  - sterilization requested - likely desires BTL, checking on insurance for [facility]; has now decided against a BTL and desires delivery at [facility], consent_x_    BOY! \"Karri\" TCS -10 @ 12pm with [facility] - ocps for contraception. Please see prenatal records for details.     Past Medical History:   Diagnosis Date    Chronic pain     right hip     Past Surgical History:   Procedure Laterality Date    ABDOMEN SURGERY PROC UNLISTED  7/1/15    CHOLECYSTECTOMY LAPAROSCOPIC W/GRAMS      DELIVERY ONLY      HX CHOLECYSTECTOMY N/A     HX ORTHOPAEDIC  2012    arthroscopy right hip    HX OTHER SURGICAL  2012    right hip surgery- collapse of labrum     HX WISDOM TEETH EXTRACTION       Social History     Occupational History    Not on file   Tobacco Use    Smoking status: Never Smoker    Smokeless tobacco: Never Used   Substance and Sexual Activity    Alcohol use: No    Drug use: No    Sexual activity: Never     Family History   Problem Relation Age of Onset    Hypertension Mother        Allergies   Allergen Reactions    Acetaminophen Nausea and Vomiting    Nsaids (Non-Steroidal Anti-Inflammatory Drug) Nausea and Vomiting     Prior to Admission medications    Medication Sig Start Date End Date Taking? Authorizing Provider   PRENATAL VIT/IRON FUMARATE/FA (PRENATAL PO) Take  by mouth. Yes Provider, Historical        Review of Systems: A comprehensive review of systems was negative except for that written in the History of Present Illness. Objective:     Vitals:  Vitals:    09/10/19 1030 09/10/19 1054   BP:  116/71   Pulse:  83   Resp:  16   Temp:  97.7 °F (36.5 °C)   SpO2:  100%   Weight: 77.6 kg (171 lb)    Height: 5' 1\" (1.549 m)         Physical Exam:  Patient without distress. Back: costovertebral angle tenderness absent  Abdomen: soft, nontender  Fundus: soft and non tender  Perineum: blood absent, amniotic fluid absent  Cervical Exam: deferred  Lower Extremities:  - Edema 1+  Membranes:  Intact  Fetal Heart Rate: 120, moderate variability, cat I    Prenatal Labs:   Lab Results   Component Value Date/Time    ABO/Rh(D) O POSITIVE 2018 06:36 AM    Rubella, External immune 2019    GrBStrep, External neg 2019    HBsAg, External neg 2019    HIV, External non reactive 2019    Gonorrhea, External neg 2019    Chlamydia, External neg 2019    ABO,Rh O POSITIVE  10/14/2014         Impression/Plan:     Plan:    - Admit for  section.   - Discussed the risks of surgery including the risks of bleeding, infection, deep vein thrombosis, and surgical injuries to internal organs including but not limited to the bowels, bladder, rectum, and female reproductive organs. The patient understands the risks; any and all questions were answered to the patient's satisfaction.  Reviewed op note, no intra-abdominal adhesive disease at time of last c/s.  - FSR/vtx/gbs neg

## 2019-09-10 NOTE — L&D DELIVERY NOTE
Delivery Summary  Patient: Kavitha Antonio             Circumcision:   desires  Additional Delivery Comments - scheduled RLTCS (prior cs x 3), large uterine window noted at delivery. \"Karri\"      Information for the patient's :  Monica Naylor [[id number]]       Labor Events:    Labor:      Steroids:     Cervical Ripening Date/Time:       Cervical Ripening Type:     Antibiotics During Labor:     Rupture Identifier:      Rupture Date/Time:       Rupture Type:     Amniotic Fluid Volume:      Amniotic Fluid Description:      Amniotic Fluid Odor:      Induction:         Induction Date/Time:        Indications for Induction:      Augmentation:     Augmentation Date/Time:      Indications for Augmentation:     Labor complications: Additional complications:        Delivery Events:  Indications For Episiotomy:     Episiotomy:     Perineal Laceration(s):     Repaired:     Periurethral Laceration Location:      Repaired:     Labial Laceration Location:     Repaired:     Sulcal Laceration Location:     Repaired:     Vaginal Laceration Location:     Repaired:     Cervical Laceration Location:     Repaired:     Repair Suture:     Number of Repair Packets:     Estimated Blood Loss (ml):  ml     Delivery Date: 9/10/2019    Delivery Time: 12:38 PM  Delivery Type: , Low Transverse  Sex:  Male    Gestational Age: 36w0d   Delivery Clinician:     Living Status: Living   Delivery Location:              APGARS  One minute Five minutes Ten minutes   Skin color: 1   1        Heart rate: 2   2        Grimace: 2   2        Muscle tone: 2   2        Breathin   2        Totals: 9   9            Presentation:      Position:        Resuscitation Method:  None;Tactile Stimulation     Meconium Stained: None      Cord Information: 3 Vessels  Complications: Knot  Cord around:    Delayed cord clamping?     Cord clamped date/time:   Disposition of Cord Blood:      Blood Gases Sent?: Placenta:  Date/Time:    Removal:        Appearance:       Paton Measurements:  Birth Weight: 3.085 kg      Birth Length: 50 cm      Head Circumference: 35 cm      Chest Circumference: 32.5 cm     Abdominal Girth: 32 cm    Other Providers:    , Obstetrician;Primary Nurse;Primary Paton Nurse;Nicu Nurse;Neonatologist;Anesthesiologist;Crna;Nurse Practitioner;Midwife;Nursery Nurse           Cord pH:  none    Episiotomy:     Laceration(s):       Estimated Blood Loss (ml): 600cc    Labor Events  Method:        Augmentation:     Cervical Ripening:                Hospital Problems  Date Reviewed: 9/10/2019          Codes Class Noted POA    Normal pregnancy ICD-10-CM: Z34.90  ICD-9-CM: V22.2  9/10/2019 Unknown              Operative Vaginal Delivery - none    Group B Strep:   Lab Results   Component Value Date/Time    GrRONDAtreterence, External neg 2019     Information for the patient's :  rFank Santana [[id number]]   No results found for: ABORH, PCTABR, PCTDIG, BILI, ABORHEXT, ABORH    No results found for: APH, APCO2, APO2, AHCO3, ABEC, ABDC, O2ST, EPHV, PCO2V, PO2V, HCO3V, EBEV, EBDV, SITE, RSCOM

## 2019-09-10 NOTE — OP NOTES
Operative Note    Name: Wilber Cleary Record Number: [id number]   YOB: 1995  Today's Date: September 10, 2019      Pre-operative Diagnosis: IUP at 39w0d, prior LTCS x 3    Post-operative Diagnosis: Same, plus uterine window    Operation: Repeat Low Transverse  Section    Surgeon(s): Skylar Mariscal MD    Anesthesia: Spinal    Prophylactic Antibiotics: Ancef  DVT Prophylaxis: Sequential Compression Devices         Fetal Description: watts     Birth Information:   Information for the patient's :  Giuseppe Broderick [[id number]]   Delivery of a 3.085 kg male infant on 9/10/2019 at 12:38 PM. Apgars were 9  and 9 . Umbilical Cord: 3 Vessels     Umbilical Cord Events: Knot     Placenta:   removal with   appearance. Amniotic Fluid Volume:        Amniotic Fluid Description:             Placenta:   Expressed    Estimated Blood Loss (ml):  600cc    Specimens: none           Complications:  Large uterine window noted upon entering peritoneum    Procedure Detail:      After proper patient identification and consent, the patient was taken to the operating room, where spinal anesthesia was administered and found to be adequate. Kim catheter had been placed using sterile technique. The patient was prepped and draped in the normal sterile fashion. The abdomen was entered using the Pfannenstiel technique. There were moderate adhesions of the rectus to the fascia that were taken down sharply without difficulty. The peritoneum was entered sharply well superior to the bladder without any apparent injury. There were some filmy adhesions of the bladder peritoneum to the lower uterine segment that were taken down sharply. There were no other intra-abdominal adhesions. The uterus was visualized and there was noted to be a large uterine window encompassing essentially the entire lower uterine segment.  A low transverse incision was made through the center of the window. Amniotomy was performed and the fluid was medium amount clear. The babys head was then delivered atraumatically. There was noted to be a double nuchal cord and a true knot in the cord. The cord was clamped and cut and the baby was handed off to Nursing staff in attendance. Placenta was then removed from the uterus. The uterus was curettaged with a moist lap pad and cleared of all clots and debris. The uterine incision was closed with 0 monocryl, single layer  in running locking fashion with good hemostasis assured. The upper portion of the uterus was very thick and the lower uterine segment was paper thin. The bladder was in close proximity to the hysterotomy so a second layer was not done. Good hemostasis was again reassured. The fascia was closed with #1 stratafix in a running fashion. Good hemostasis was assured. The subcutaneous tissue was closed with 4-0 plain gut. The skin was closed with insorb subcuticular closure. The patient tolerated the procedure well. Sponge, lap, and needle counts were correct times three and the patient and baby were taken to recovery/postpartum room in stable condition.     Annabel Fraser MD  September 10, 2019  1:10 PM

## 2019-09-10 NOTE — DISCHARGE INSTRUCTIONS
Patient Education         Section: What to Expect at Home  Your Recovery    A  section, or , is surgery to deliver your baby through a cut, called an incision, that the doctor makes in your lower belly and uterus. You may have some pain in your lower belly and need pain medicine for 1 to 2 weeks. You can expect some vaginal bleeding for several weeks. You will probably need about 6 weeks to fully recover. It is important to take it easy while the incision is healing. Avoid heavy lifting, strenuous activities, or exercises that strain the belly muscles while you are recovering. Ask a family member or friend for help with housework, cooking, and shopping. This care sheet gives you a general idea about how long it will take for you to recover. But each person recovers at a different pace. Follow the steps below to get better as quickly as possible. How can you care for yourself at home? Activity    · Rest when you feel tired. Getting enough sleep will help you recover.     · Try to walk each day. Start by walking a little more than you did the day before. Bit by bit, increase the amount you walk. Walking boosts blood flow and helps prevent pneumonia, constipation, and blood clots.     · Avoid strenuous activities, such as bicycle riding, jogging, weightlifting, and aerobic exercise, for 6 weeks or until your doctor says it is okay.     · Until your doctor says it is okay, do not lift anything heavier than your baby.     · Do not do sit-ups or other exercises that strain the belly muscles for 6 weeks or until your doctor says it is okay.     · Hold a pillow over your incision when you cough or take deep breaths. This will support your belly and decrease your pain.     · You may shower as usual. Pat the incision dry when you are done.     · You will have some vaginal bleeding. Wear sanitary pads.  Do not douche or use tampons until your doctor says it is okay.     · Ask your doctor when you can drive again.     · You will probably need to take at least 6 weeks off work. It depends on the type of work you do and how you feel.     · Ask your doctor when it is okay for you to have sex. Diet    · You can eat your normal diet. If your stomach is upset, try bland, low-fat foods like plain rice, broiled chicken, toast, and yogurt.     · Drink plenty of fluids (unless your doctor tells you not to).     · You may notice that your bowel movements are not regular right after your surgery. This is common. Try to avoid constipation and straining with bowel movements. You may want to take a fiber supplement every day. If you have not had a bowel movement after a couple of days, ask your doctor about taking a mild laxative.     · If you are breastfeeding, limit alcohol. Alcohol can cause a lack of energy and other health problems for the baby when a breastfeeding woman drinks heavily. It can also get in the way of a mom's ability to feed her baby or to care for the child in other ways. There isn't a lot of research about exactly how much alcohol can harm a baby. Having no alcohol is the safest choice for your baby. If you choose to have a drink now and then, have only one drink, and limit the number of occasions that you have a drink. Wait to breastfeed at least 2 hours after you have a drink to reduce the amount of alcohol the baby may get in the milk. Medicines    · Your doctor will tell you if and when you can restart your medicines. He or she will also give you instructions about taking any new medicines.     · If you take blood thinners, such as warfarin (Coumadin), clopidogrel (Plavix), or aspirin, be sure to talk to your doctor. He or she will tell you if and when to start taking those medicines again. Make sure that you understand exactly what your doctor wants you to do.     · Take pain medicines exactly as directed. ? If the doctor gave you a prescription medicine for pain, take it as prescribed. ?  If you are not taking a prescription pain medicine, ask your doctor if you can take an over-the-counter medicine.     · If you think your pain medicine is making you sick to your stomach:  ? Take your medicine after meals (unless your doctor has told you not to). ? Ask your doctor for a different pain medicine.     · If your doctor prescribed antibiotics, take them as directed. Do not stop taking them just because you feel better. You need to take the full course of antibiotics. Incision care    · If you have strips of tape on the incision, leave the tape on for a week or until it falls off.     · Wash the area daily with warm, soapy water, and pat it dry. Don't use hydrogen peroxide or alcohol, which can slow healing. You may cover the area with a gauze bandage if it weeps or rubs against clothing. Change the bandage every day.     · Keep the area clean and dry. Other instructions    · If you breastfeed your baby, you may be more comfortable while you are healing if you place the baby so that he or she is not resting on your belly. Try tucking your baby under your arm, with his or her body along the side you will be feeding on. Support your baby's upper body with your arm. With that hand you can control your baby's head to bring his or her mouth to your breast. This is sometimes called the football hold. Follow-up care is a key part of your treatment and safety. Be sure to make and go to all appointments, and call your doctor if you are having problems. It's also a good idea to know your test results and keep a list of the medicines you take. When should you call for help? [id number] anytime you think you may need emergency care.  For example, call if:    · You have thoughts of harming yourself, your baby, or another person.     · You passed out (lost consciousness).     · You have chest pain, are short of breath, or cough up blood.     · You have a seizure.    Call your doctor now or seek immediate medical care if:    · You have pain that does not get better after you take pain medicine.     · You have severe vaginal bleeding.     · You are dizzy or lightheaded, or you feel like you may faint.     · You have new or worse pain in your belly or pelvis.     · You have loose stitches, or your incision comes open.     · You have symptoms of infection, such as:  ? Increased pain, swelling, warmth, or redness. ? Red streaks leading from the incision. ? Pus draining from the incision. ? A fever.     · You have symptoms of a blood clot in your leg (called a deep vein thrombosis), such as:  ? Pain in your calf, back of the knee, thigh, or groin. ? Redness and swelling in your leg or groin.     · You have signs of preeclampsia, such as:  ? Sudden swelling of your face, hands, or feet. ? New vision problems (such as dimness, blurring, or seeing spots). ? A severe headache.    Watch closely for changes in your health, and be sure to contact your doctor if:    · You do not get better as expected. Where can you learn more? Go to http://shanta-luma.info/. Enter M806 in the search box to learn more about \" Section: What to Expect at Home. \"  Current as of: 2018  Content Version: 12.1  © 1496-5773 [employer]. Care instructions adapted under license by [employer] (which disclaims liability or warranty for this information). If you have questions about a medical condition or this instruction, always ask your healthcare professional. [place] [address] any warranty or liability for your use of this information.

## 2019-09-10 NOTE — H&P
Operative Note    Name: Wilber Cleary Record Number: [id number]   YOB: 1995  Today's Date: September 10, 2019      Pre-operative Diagnosis: IUP at 39w0d, prior LTCS x 3    Post-operative Diagnosis: Same, plus uterine window    Operation: Repeat Low Transverse  Section    Surgeon(s): Munira Turner MD    Anesthesia: Spinal    Prophylactic Antibiotics: Ancef  DVT Prophylaxis: Sequential Compression Devices         Fetal Description: watts     Birth Information:   Information for the patient's :  Pete Walker [[id number]]   Delivery of a 3.085 kg male infant on 9/10/2019 at 12:38 PM. Apgars were 9  and 9 . Umbilical Cord: 3 Vessels     Umbilical Cord Events: Knot     Placenta:   removal with   appearance. Amniotic Fluid Volume:        Amniotic Fluid Description:             Placenta:   Expressed    Estimated Blood Loss (ml):  600cc    Specimens: none           Complications:  Large uterine window noted upon entering peritoneum    Procedure Detail:      After proper patient identification and consent, the patient was taken to the operating room, where spinal anesthesia was administered and found to be adequate. Kim catheter had been placed using sterile technique. The patient was prepped and draped in the normal sterile fashion. The abdomen was entered using the Pfannenstiel technique. The peritoneum was entered sharply well superior to the bladder without any apparent injury. The uterus was visualized and there was noted to be a large uterine window encompassing essentially the entire lower uterine segment. A low transverse incision was made through the center of the window. Amniotomy was performed and the fluid was medium amount clear. The babys head was then delivered atraumatically. There was noted to be a double nuchal cord and a true knot in the cord.  The cord was clamped and cut and the baby was handed off to Nursing staff in attendance. Placenta was then removed from the uterus. The uterus was curettaged with a moist lap pad and cleared of all clots and debris. The uterine incision was closed with 0 monocryl, single layer  in running locking fashion with good hemostasis assured. The upper portion of the uterus was very thick and the lower uterine segment was paper thin. The bladder was in close proximity to the hysterotomy so a second layer was not done. Good hemostasis was again reassured. The fascia was closed with #1 stratafix in a running fashion. Good hemostasis was assured. The subcutaneous tissue was closed with 4-0 plain gut. The skin was closed with insorb subcuticular closure. The patient tolerated the procedure well. Sponge, lap, and needle counts were correct times three and the patient and baby were taken to recovery/postpartum room in stable condition.     General Fernando MD  September 10, 2019  1:10 PM

## 2019-09-10 NOTE — ANESTHESIA PREPROCEDURE EVALUATION
Anesthetic History   No history of anesthetic complications            Review of Systems / Medical History  Patient summary reviewed, nursing notes reviewed and pertinent labs reviewed    Pulmonary  Within defined limits                 Neuro/Psych   Within defined limits           Cardiovascular  Within defined limits                Exercise tolerance: >4 METS     GI/Hepatic/Renal  Within defined limits             Comments: S/P Cholecsytectomy (7/1/15) Endo/Other  Within defined limits           Other Findings   Comments: IUP - Previous  Section x 3           Physical Exam    Airway  Mallampati: II  TM Distance: 4 - 6 cm  Neck ROM: normal range of motion   Mouth opening: Normal     Cardiovascular  Regular rate and rhythm,  S1 and S2 normal,  no murmur, click, rub, or gallop             Dental  No notable dental hx       Pulmonary  Breath sounds clear to auscultation               Abdominal  GI exam deferred       Other Findings            Anesthetic Plan    ASA: 2  Anesthesia type: spinal      Post-op pain plan if not by surgeon: intrathecal opiates      Anesthetic plan and risks discussed with: Patient

## 2019-09-10 NOTE — ANESTHESIA POSTPROCEDURE EVALUATION
Procedure(s):   SECTION. spinal    Anesthesia Post Evaluation        Patient location during evaluation: PACU  Note status: Adequate. Level of consciousness: responsive to verbal stimuli and sleepy but conscious  Pain management: satisfactory to patient  Airway patency: patent  Anesthetic complications: no  Cardiovascular status: acceptable  Respiratory status: acceptable  Hydration status: acceptable  Comments: +Post-Anesthesia Evaluation and Assessment    Patient: Gustavo Sanchez MRN: [id number]  SSN: [id number]   YOB: 1995  Age: 27 y.o. Sex: female      Cardiovascular Function/Vital Signs    /85   Pulse 93   Temp 36.9 °C (98.4 °F)   Resp 14   Ht 5' 1\" (1.549 m)   Wt 77.6 kg (171 lb)   SpO2 99%   Breastfeeding? Yes   BMI 32.31 kg/m²     Patient is status post Procedure(s):   SECTION. Nausea/Vomiting: Controlled. Postoperative hydration reviewed and adequate. Pain:  Pain Scale 1: Numeric (0 - 10) (09/10/19 1040)  Pain Intensity 1: 0 (09/10/19 1040)   Managed. Neurological Status:   Neuro (WDL): Within Defined Limits (09/10/19 1040)   At baseline. Mental Status and Level of Consciousness: Arousable. Pulmonary Status:   O2 Device: Room air (09/10/19 1306)   Adequate oxygenation and airway patent. Complications related to anesthesia: None    Post-anesthesia assessment completed. No concerns.     Signed By: Masha Swann MD    9/10/2019  Post anesthesia nausea and vomiting:  controlled      Vitals Value Taken Time   /85 9/10/2019  1:06 PM   Temp 36.9 °C (98.4 °F) 9/10/2019  1:06 PM   Pulse 93 9/10/2019  1:06 PM   Resp 14 9/10/2019  1:06 PM   SpO2 99 % 9/10/2019  1:06 PM

## 2019-09-11 LAB
BASOPHILS # BLD: 0 K/UL (ref 0–0.1)
BASOPHILS NFR BLD: 0 % (ref 0–1)
DIFFERENTIAL METHOD BLD: ABNORMAL
EOSINOPHIL # BLD: 0.1 K/UL (ref 0–0.4)
EOSINOPHIL NFR BLD: 1 % (ref 0–7)
ERYTHROCYTE [DISTWIDTH] IN BLOOD BY AUTOMATED COUNT: 15.2 % (ref 11.5–14.5)
HCT VFR BLD AUTO: 28.3 % (ref 35–47)
HGB BLD-MCNC: 8.5 G/DL (ref 11.5–16)
IMM GRANULOCYTES # BLD AUTO: 0.1 K/UL (ref 0–0.04)
IMM GRANULOCYTES NFR BLD AUTO: 1 % (ref 0–0.5)
LYMPHOCYTES # BLD: 1.2 K/UL (ref 0.8–3.5)
LYMPHOCYTES NFR BLD: 12 % (ref 12–49)
MCH RBC QN AUTO: 23.9 PG (ref 26–34)
MCHC RBC AUTO-ENTMCNC: 30 G/DL (ref 30–36.5)
MCV RBC AUTO: 79.5 FL (ref 80–99)
MONOCYTES # BLD: 0.8 K/UL (ref 0–1)
MONOCYTES NFR BLD: 8 % (ref 5–13)
NEUTS SEG # BLD: 7.7 K/UL (ref 1.8–8)
NEUTS SEG NFR BLD: 78 % (ref 32–75)
NRBC # BLD: 0 K/UL (ref 0–0.01)
NRBC BLD-RTO: 0 PER 100 WBC
PLATELET # BLD AUTO: 194 K/UL (ref 150–400)
PMV BLD AUTO: 11.3 FL (ref 8.9–12.9)
RBC # BLD AUTO: 3.56 M/UL (ref 3.8–5.2)
WBC # BLD AUTO: 9.8 K/UL (ref 3.6–11)

## 2019-09-11 PROCEDURE — 36415 COLL VENOUS BLD VENIPUNCTURE: CPT

## 2019-09-11 PROCEDURE — 74011250637 HC RX REV CODE- 250/637: Performed by: OBSTETRICS & GYNECOLOGY

## 2019-09-11 PROCEDURE — 74011250636 HC RX REV CODE- 250/636: Performed by: OBSTETRICS & GYNECOLOGY

## 2019-09-11 PROCEDURE — 65410000002 HC RM PRIVATE OB

## 2019-09-11 PROCEDURE — 85025 COMPLETE CBC W/AUTO DIFF WBC: CPT

## 2019-09-11 RX ADMIN — OXYCODONE AND ACETAMINOPHEN 2 TABLET: 5; 325 TABLET ORAL at 09:50

## 2019-09-11 RX ADMIN — IBUPROFEN 800 MG: 400 TABLET, FILM COATED ORAL at 21:57

## 2019-09-11 RX ADMIN — SODIUM CHLORIDE, SODIUM LACTATE, POTASSIUM CHLORIDE, AND CALCIUM CHLORIDE 125 ML/HR: 600; 310; 30; 20 INJECTION, SOLUTION INTRAVENOUS at 07:05

## 2019-09-11 RX ADMIN — DOCUSATE SODIUM 100 MG: 100 CAPSULE, LIQUID FILLED ORAL at 09:52

## 2019-09-11 RX ADMIN — OXYCODONE AND ACETAMINOPHEN 2 TABLET: 5; 325 TABLET ORAL at 14:38

## 2019-09-11 RX ADMIN — OXYCODONE AND ACETAMINOPHEN 2 TABLET: 5; 325 TABLET ORAL at 02:19

## 2019-09-11 RX ADMIN — OXYCODONE AND ACETAMINOPHEN 2 TABLET: 5; 325 TABLET ORAL at 20:58

## 2019-09-11 RX ADMIN — DOCUSATE SODIUM 100 MG: 100 CAPSULE, LIQUID FILLED ORAL at 20:58

## 2019-09-11 NOTE — PROGRESS NOTES
Duramorph Follow-Up Note    1 Day Post-Op sp Procedure(s):   SECTION. BP 93/55   Pulse 80   Temp 36.6 °C (97.9 °F)   Resp 16   Ht 5' 1\" (1.549 m)   Wt 77.6 kg (171 lb)   SpO2 98%   Breastfeeding? Yes   BMI 32.31 kg/m² . Patient is POD-1 S/P intrathecal duramorph. Pain is well controlled  Patient reports no  fever, weakness or numbness. She does have mild HA. Epidural/spinal tap site is clean, dry and intact. No obvious Anesthesia complications noted. She was instructed to tell nurse if her HA worsens or becomes positional.      Plan:    Pain management as per primary service.

## 2019-09-11 NOTE — LACTATION NOTE
This note was copied from a baby's chart. P4  Pt will successfully establish breastfeeding by feeding in response to early feeding cues or wake every 3h, will obtain deep latch, and will keep log of feedings/output. Taught to BF at hunger cues and or q 2-3 hrs and to offer 10-20 drops of hand expressed colostrum at any non-feeds. Listed exclusive breast feeding in plans, changed to wanting formula during one sleepy feeding. Reviewed normal  feeding patterns/daily expectations. Reviewed breastfeeding basics:  How milk is made and normal  breastfeeding behaviors discussed. Supply and demand,  stomach size, early feeding cues, skin to skin bonding, positioning and baby led latch-on, assymetrical latch with signs of good, deep latch vs shallow, feeding frequency and duration, and log sheet for tracking infant feedings and output. Breastfeeding Booklet and Warm line information given. Discussed typical  weight loss and the importance of infant weight checks with pediatrician 1 day post discharge. Stated that during this pregnancy started massaging breasts with essential oils in hopes she would see more milk. Reviewed hormonal changes/supply and demand and certainly massage and expression assist in emptying breast/makes more milk. History with 1st 3 reviewed/used formula with all. Breast- Feeding Assessment  Attends Breast-Feeding Classes: No  Breast-Feeding Experience: Yes(tried with all 3/used formula early on with each. Last left hospital only formula)  Breast Trauma/Surgery: No  Type/Quality: Good  Lactation Consultant Visits  Breast-Feedings: Good      LATCH Documentation  Latch: Repeated attempts, hold nipple in mouth, stimulate to suck  Audible Swallowing: None  Type of Nipple: Everted (after stimulation)  Comfort (Breast/Nipple): Soft/non-tender  Hold (Positioning): No assist from staff, mother able to position/hold infant  LATCH Score: 7   LC # provided. Call prn.

## 2019-09-11 NOTE — ROUTINE PROCESS
Bedside and Verbal shift change report given to ETHAN Isbell RN (oncoming nurse) by Brad Yuan RN (offgoing nurse). Report included the following information SBAR, Procedure Summary, Intake/Output and MAR.

## 2019-09-11 NOTE — ROUTINE PROCESS
Bedside and Verbal shift change report given to SONI Ferrer RN (oncoming nurse) by Kavitha Bullard (offgoing nurse). Report included the following information SBAR, Procedure Summary, Intake/Output, MAR and Recent Results   .

## 2019-09-11 NOTE — PROGRESS NOTES
REport from Brad Yuan RN  0800 IV stopped, pt tolerating PO fluids. Diet changed to regular, however pt states she can not eat any beef or pork,. Even cross contaminated items that may have touched beef/pork. Diet order placed with notation. States it causes an extreme sour stomach and vomit that does not get releif from any medications. Pt states her  will go out and get her specific food rather than eat here. 0900 Pt eating breakfast, will call when completed for medication. No motrin orders, pt states she can take it with food. Percocet is ordered, pt states she will let us know if her stomach is upset with percocet.    3016 Kim removed while pt in bed, reviewed safety and falls with patient, education provided to not get up with out assistance, due to void by 1600.    1100 REport to [facility] [employer]

## 2019-09-11 NOTE — PROGRESS NOTES
While assessing the patient I asked her about her allergies to acetaminophen and NSAIDS. The patient stated that it was not a true allergy, She just gets an upset stomach sometimes and that if it gets that way then she may decline the medication. I talked with the patient about getting up out of the bed. She stated that she still felt numb. Will try again at a later time    0030- Patient states she is now not feeling well enough to get up out of bed. She states that she feels a little dizzy when sitting up. I told her that we at least need to sit on the side of the bed before morning. Patient states understanding. 0430- Patient was able to sit on the side of the bed. After sitting for a few minutes she was able to stand up. She states that the dizziness is getting better. I told her that maybe after she has breakfast she will feel better. [place] care performed and pads changed.

## 2019-09-11 NOTE — ROUTINE PROCESS
Bedside and Verbal shift change report given to LISA Donaldson RN (oncoming nurse) by Guera Isbell RN (offgoing nurse). Report included the following information SBAR, Procedure Summary, Intake/Output and MAR.

## 2019-09-11 NOTE — ROUTINE PROCESS
Bedside and Verbal shift change report given to DAMION Smith Cha (oncoming nurse) by Manuelito Love RN (offgoing nurse). Report included the following information SBAR, Kardex, Procedure Summary, Intake/Output, MAR and Recent Results.

## 2019-09-11 NOTE — PROGRESS NOTES
Post-Operative  Day 1    Chantel Peterson     Assessment: Post-Op day 1, stable    Plan:   1. Routine post-operative care   2. Desires circ due to feeding. Information for the patient's :  Patrice De Santiago [[id number]]   , Low Transverse   Patient doing well without significant complaint. Nausea and vomiting resolved, tolerating liquids, no flatus, faustin in place. Vitals:  Visit Vitals  BP 93/55   Pulse 80   Temp 97.9 °F (36.6 °C)   Resp 16   Ht 5' 1\" (1.549 m)   Wt 77.6 kg (171 lb)   SpO2 98%   Breastfeeding? Yes   BMI 32.31 kg/m²     Temp (24hrs), Av °F (36.7 °C), Min:97.6 °F (36.4 °C), Max:98.4 °F (36.9 °C)      Last 24hr Input/Output:    Intake/Output Summary (Last 24 hours) at 2019 0928  Last data filed at 2019 0730  Gross per 24 hour   Intake 5986.67 ml   Output 4800 ml   Net 1186.67 ml          Exam:        Patient without distress. Lungs clear. Abdomen, bowel sounds present, soft, expected tenderness, fundus firm Wound dressing intact     Perineum normal lochia noted               Lower extremities are negative for swelling, cords or tenderness.     Labs:   Lab Results   Component Value Date/Time    WBC 9.8 2019 04:50 AM    WBC 8.9 09/10/2019 10:19 AM    WBC 7.5 07/10/2018 04:45 AM    WBC 11.2 (H) 2018 03:39 PM    WBC 7.7 2018 06:36 AM    WBC 8.0 2017 10:07 PM    WBC 10.0 2016 06:11 AM    WBC 7.0 2016 10:54 AM    WBC 5.5 2015 04:34 AM    WBC 7.1 2015 09:21 AM    WBC 12.8 (H) 2015 01:08 PM    WBC 12.7 (H) 06/10/2015 03:55 AM    WBC 8.0 2015 09:24 PM    WBC 10.1 (H) 2013 01:57 PM    HGB 8.5 (L) 2019 04:50 AM    HGB 10.9 (L) 09/10/2019 10:19 AM    HGB 8.6 (L) 07/10/2018 04:45 AM    HGB 9.6 (L) 2018 03:39 PM    HGB 11.3 (L) 2018 06:36 AM    HGB 12.3 2017 10:07 PM    HGB 8.9 (L) 2016 06:11 AM    HGB 10.9 (L) 2016 10:54 AM    HGB 9.8 (L) 2015 04:34 AM HGB 11.2 (L) 06/30/2015 09:21 AM    HGB 11.3 (L) 06/23/2015 01:08 PM    HGB 8.1 (L) 06/10/2015 03:55 AM    HGB 11.5 06/08/2015 09:24 PM    HGB 13.6 (H) 02/18/2013 01:57 PM    HCT 28.3 (L) 09/11/2019 04:50 AM    HCT 34.5 (L) 09/10/2019 10:19 AM    HCT 26.7 (L) 07/10/2018 04:45 AM    HCT 29.9 (L) 07/09/2018 03:39 PM    HCT 35.0 07/09/2018 06:36 AM    HCT 37.9 01/24/2017 10:07 PM    HCT 27.4 (L) 06/08/2016 06:11 AM    HCT 33.7 (L) 06/06/2016 10:54 AM    HCT 31.1 (L) 07/01/2015 04:34 AM    HCT 35.3 06/30/2015 09:21 AM    HCT 36.2 06/23/2015 01:08 PM    HCT 25.2 (L) 06/10/2015 03:55 AM    HCT 34.3 (L) 06/08/2015 09:24 PM    HCT 41.4 (H) 02/18/2013 01:57 PM    PLATELET 449 05/74/5626 04:50 AM    PLATELET 412 90/80/2694 10:19 AM    PLATELET 284 88/60/3061 04:45 AM    PLATELET 406 23/84/3080 03:39 PM    PLATELET 872 48/80/8614 06:36 AM    PLATELET 129 40/56/8398 10:07 PM    PLATELET 989 22/93/5147 06:11 AM    PLATELET 732 95/92/0857 10:54 AM    PLATELET 533 21/38/1415 04:34 AM    PLATELET 038 33/06/5166 09:21 AM    PLATELET 058 (H) 30/54/6489 01:08 PM    PLATELET 695 49/47/4711 03:55 AM    PLATELET 734 82/01/6771 09:24 PM    PLATELET 020 (H) 03/99/8667 01:57 PM       Recent Results (from the past 24 hour(s))   TYPE & SCREEN    Collection Time: 09/10/19 10:00 AM   Result Value Ref Range    Crossmatch Expiration 09/13/2019     ABO/Rh(D) O POSITIVE     Antibody screen NEG    CBC WITH AUTOMATED DIFF    Collection Time: 09/10/19 10:19 AM   Result Value Ref Range    WBC 8.9 3.6 - 11.0 K/uL    RBC 4.41 3.80 - 5.20 M/uL    HGB 10.9 (L) 11.5 - 16.0 g/dL    HCT 34.5 (L) 35.0 - 47.0 %    MCV 78.2 (L) 80.0 - 99.0 FL    MCH 24.7 (L) 26.0 - 34.0 PG    MCHC 31.6 30.0 - 36.5 g/dL    RDW 15.1 (H) 11.5 - 14.5 %    PLATELET 671 015 - 732 K/uL    MPV 11.1 8.9 - 12.9 FL    NRBC 0.0 0  WBC    ABSOLUTE NRBC 0.00 0.00 - 0.01 K/uL    NEUTROPHILS 67 32 - 75 %    LYMPHOCYTES 22 12 - 49 %    MONOCYTES 9 5 - 13 %    EOSINOPHILS 1 0 - 7 % BASOPHILS 0 0 - 1 %    IMMATURE GRANULOCYTES 1 (H) 0.0 - 0.5 %    ABS. NEUTROPHILS 6.0 1.8 - 8.0 K/UL    ABS. LYMPHOCYTES 1.9 0.8 - 3.5 K/UL    ABS. MONOCYTES 0.8 0.0 - 1.0 K/UL    ABS. EOSINOPHILS 0.1 0.0 - 0.4 K/UL    ABS. BASOPHILS 0.0 0.0 - 0.1 K/UL    ABS. IMM. GRANS. 0.0 0.00 - 0.04 K/UL    DF AUTOMATED     CBC WITH AUTOMATED DIFF    Collection Time: 09/11/19  4:50 AM   Result Value Ref Range    WBC 9.8 3.6 - 11.0 K/uL    RBC 3.56 (L) 3.80 - 5.20 M/uL    HGB 8.5 (L) 11.5 - 16.0 g/dL    HCT 28.3 (L) 35.0 - 47.0 %    MCV 79.5 (L) 80.0 - 99.0 FL    MCH 23.9 (L) 26.0 - 34.0 PG    MCHC 30.0 30.0 - 36.5 g/dL    RDW 15.2 (H) 11.5 - 14.5 %    PLATELET 880 988 - 463 K/uL    MPV 11.3 8.9 - 12.9 FL    NRBC 0.0 0  WBC    ABSOLUTE NRBC 0.00 0.00 - 0.01 K/uL    NEUTROPHILS 78 (H) 32 - 75 %    LYMPHOCYTES 12 12 - 49 %    MONOCYTES 8 5 - 13 %    EOSINOPHILS 1 0 - 7 %    BASOPHILS 0 0 - 1 %    IMMATURE GRANULOCYTES 1 (H) 0.0 - 0.5 %    ABS. NEUTROPHILS 7.7 1.8 - 8.0 K/UL    ABS. LYMPHOCYTES 1.2 0.8 - 3.5 K/UL    ABS. MONOCYTES 0.8 0.0 - 1.0 K/UL    ABS. EOSINOPHILS 0.1 0.0 - 0.4 K/UL    ABS. BASOPHILS 0.0 0.0 - 0.1 K/UL    ABS. IMM.  GRANS. 0.1 (H) 0.00 - 0.04 K/UL    DF AUTOMATED

## 2019-09-12 VITALS
BODY MASS INDEX: 32.28 KG/M2 | DIASTOLIC BLOOD PRESSURE: 67 MMHG | RESPIRATION RATE: 17 BRPM | HEART RATE: 74 BPM | WEIGHT: 171 LBS | SYSTOLIC BLOOD PRESSURE: 90 MMHG | HEIGHT: 61 IN | OXYGEN SATURATION: 98 % | TEMPERATURE: 98.3 F

## 2019-09-12 PROCEDURE — 74011250637 HC RX REV CODE- 250/637: Performed by: OBSTETRICS & GYNECOLOGY

## 2019-09-12 RX ORDER — OXYCODONE AND ACETAMINOPHEN 5; 325 MG/1; MG/1
1-2 TABLET ORAL
Qty: 25 TAB | Refills: 0 | Status: SHIPPED | OUTPATIENT
Start: 2019-09-12 | End: 2019-09-26

## 2019-09-12 RX ORDER — IBUPROFEN 600 MG/1
600 TABLET ORAL
Qty: 30 TAB | Refills: 0 | Status: SHIPPED | OUTPATIENT
Start: 2019-09-12 | End: 2020-09-06

## 2019-09-12 RX ADMIN — OXYCODONE AND ACETAMINOPHEN 2 TABLET: 5; 325 TABLET ORAL at 13:17

## 2019-09-12 RX ADMIN — OXYCODONE AND ACETAMINOPHEN 2 TABLET: 5; 325 TABLET ORAL at 01:19

## 2019-09-12 RX ADMIN — IBUPROFEN 800 MG: 400 TABLET, FILM COATED ORAL at 05:57

## 2019-09-12 RX ADMIN — OXYCODONE AND ACETAMINOPHEN 2 TABLET: 5; 325 TABLET ORAL at 08:20

## 2019-09-12 NOTE — PROGRESS NOTES
2904 Patient discharge prescriptions & instructions given. Verbalized understanding. All questions answered. No distress noted. Signed copy of discharge instructions on paper chart.

## 2019-09-12 NOTE — DISCHARGE SUMMARY
Obstetrical Discharge Summary     Name: Vance Chester MRN: [id number]  SSN: [id number]    YOB: 1995  Age: 27 y.o. Sex: female      Admit Date: 9/10/2019    Discharge Date: 2019     Admitting Physician: Lorene Conde MD     Attending Physician: Sherry Niño, *     Admission Diagnoses: Normal pregnancy [Z34.90]    Discharge Diagnoses:   Information for the patient's :  Brandin Steele [[id number]]   Delivery of a 3.085 kg male infant via , Low Transverse on 9/10/2019 at 12:38 PM  by Lorene Conde. Apgars were 9  and 9 . Additional Diagnoses:   Hospital Problems  Date Reviewed: 9/10/2019          Codes Class Noted POA    Normal pregnancy ICD-10-CM: Z34.90  ICD-9-CM: V22.2  9/10/2019 Unknown             Lab Results   Component Value Date/Time    Rubella, External immune 2019    GrBStrep, External neg 2019       Hospital Course: Normal hospital course following the delivery. Disposition at Discharge: Home or self care    Discharged Condition: Stable    Patient Instructions:   Current Discharge Medication List      START taking these medications    Details   ibuprofen (MOTRIN) 600 mg tablet Take 1 Tab by mouth every six (6) hours as needed for Pain for up to 360 days. Qty: 30 Tab, Refills: 0      oxyCODONE-acetaminophen (PERCOCET) 5-325 mg per tablet Take 1-2 Tabs by mouth every four (4) hours as needed for Pain for up to 14 days. Max Daily Amount: 12 Tabs. Qty: 25 Tab, Refills: 0    Associated Diagnoses: Postoperative pain         CONTINUE these medications which have NOT CHANGED    Details   PRENATAL VIT/IRON FUMARATE/FA (PRENATAL PO) Take  by mouth. Reference my discharge instructions.     Follow-up Appointments   Procedures    FOLLOW UP VISIT Appointment in: 10 Weeks With Dr Justin Harkins     With Dr Justin Harkins     Standing Status:   Standing     Number of Occurrences:   1     Order Specific Question:   Appointment in Answer:   6 Weeks        Signed By:  Bronwen Hatchet, MD     September 12, 2019

## 2019-09-12 NOTE — PROGRESS NOTES
Bedside and Verbal shift change report given to RASHARD Grossman RN (oncoming nurse) by [employer], RN (offgoing nurse). Report included the following information SBAR, Kardex and MAR.

## 2019-09-12 NOTE — PROGRESS NOTES
Post-Operative  Day 2    Chantel Peterson       Assessment: Post-Op day 2, doing well and would like to go home today if baby ready    Plan:   1. Discharge home today  2. Follow up in office in 6 weeks with Noemí Niño, *  3. Post partum activity/wound care advised, diet as tolerated  4. Discharge Medications: ibuprofen, percocet and medications prior to admission  5. Desires circumcision today- risks of bleeding, infection and cosmetic deformity reviewed      Information for the patient's :  Anh Boyce [[id number]]   , Low Transverse   Patient doing well without significant complaint. Tolerating diet, passing flatus, voiding and ambulating without difficulty    Vitals:  Visit Vitals  BP 90/67 (BP 1 Location: Left arm, BP Patient Position: At rest)   Pulse 74   Temp 98.3 °F (36.8 °C)   Resp 17   Ht 5' 1\" (1.549 m)   Wt 77.6 kg (171 lb)   SpO2 98%   Breastfeeding? Yes   BMI 32.31 kg/m²     Temp (24hrs), Av °F (36.7 °C), Min:97.6 °F (36.4 °C), Max:98.3 °F (36.8 °C)        Exam:        Patient without distress. Abdomen, bowel sounds present, soft, expected tenderness, fundus firm                Wound incision clean, dry and intact               Lower extremities are negative for swelling, cords or tenderness.     Labs:   Lab Results   Component Value Date/Time    WBC 9.8 2019 04:50 AM    WBC 8.9 09/10/2019 10:19 AM    WBC 7.5 07/10/2018 04:45 AM    WBC 11.2 (H) 2018 03:39 PM    WBC 7.7 2018 06:36 AM    WBC 8.0 2017 10:07 PM    WBC 10.0 2016 06:11 AM    WBC 7.0 2016 10:54 AM    WBC 5.5 2015 04:34 AM    WBC 7.1 2015 09:21 AM    WBC 12.8 (H) 2015 01:08 PM    WBC 12.7 (H) 06/10/2015 03:55 AM    WBC 8.0 2015 09:24 PM    WBC 10.1 (H) 2013 01:57 PM    HGB 8.5 (L) 2019 04:50 AM    HGB 10.9 (L) 09/10/2019 10:19 AM    HGB 8.6 (L) 07/10/2018 04:45 AM    HGB 9.6 (L) 2018 03:39 PM    HGB 11.3 (L) 07/09/2018 06:36 AM    HGB 12.3 01/24/2017 10:07 PM    HGB 8.9 (L) 06/08/2016 06:11 AM    HGB 10.9 (L) 06/06/2016 10:54 AM    HGB 9.8 (L) 07/01/2015 04:34 AM    HGB 11.2 (L) 06/30/2015 09:21 AM    HGB 11.3 (L) 06/23/2015 01:08 PM    HGB 8.1 (L) 06/10/2015 03:55 AM    HGB 11.5 06/08/2015 09:24 PM    HGB 13.6 (H) 02/18/2013 01:57 PM    HCT 28.3 (L) 09/11/2019 04:50 AM    HCT 34.5 (L) 09/10/2019 10:19 AM    HCT 26.7 (L) 07/10/2018 04:45 AM    HCT 29.9 (L) 07/09/2018 03:39 PM    HCT 35.0 07/09/2018 06:36 AM    HCT 37.9 01/24/2017 10:07 PM    HCT 27.4 (L) 06/08/2016 06:11 AM    HCT 33.7 (L) 06/06/2016 10:54 AM    HCT 31.1 (L) 07/01/2015 04:34 AM    HCT 35.3 06/30/2015 09:21 AM    HCT 36.2 06/23/2015 01:08 PM    HCT 25.2 (L) 06/10/2015 03:55 AM    HCT 34.3 (L) 06/08/2015 09:24 PM    HCT 41.4 (H) 02/18/2013 01:57 PM    PLATELET 283 26/03/1446 04:50 AM    PLATELET 374 99/82/5145 10:19 AM    PLATELET 661 58/83/1312 04:45 AM    PLATELET 586 25/38/8358 03:39 PM    PLATELET 535 61/36/5361 06:36 AM    PLATELET 670 48/75/6874 10:07 PM    PLATELET 403 00/02/3990 06:11 AM    PLATELET 487 83/58/8466 10:54 AM    PLATELET 405 45/32/1821 04:34 AM    PLATELET 331 32/48/3048 09:21 AM    PLATELET 597 (H) 08/92/2829 01:08 PM    PLATELET 766 27/94/9468 03:55 AM    PLATELET 686 97/07/9142 09:24 PM    PLATELET 070 (H) 89/68/7028 01:57 PM       No results found for this or any previous visit (from the past 24 hour(s)).

## (undated) DEVICE — DEVON™ KNEE AND BODY STRAP 60" X 3" (1.5 M X 7.6 CM): Brand: DEVON

## (undated) DEVICE — STERILE POLYISOPRENE POWDER-FREE SURGICAL GLOVES: Brand: PROTEXIS

## (undated) DEVICE — SUTURE VCRL SZ 0 L36IN ABSRB VLT L40MM CT 1/2 CIR J358H

## (undated) DEVICE — PENCIL ES L3M BTTN SWCH S STL HEX LOK BLDE ELECTRD HOLSTER

## (undated) DEVICE — SUTURE STRATAFIX SYMMETRIC SZ 1 L18IN ABSRB VLT CT1 L36CM SXPP1A404

## (undated) DEVICE — GOWN,AURORA,FABRIC-REINFORCED,X-LARGE: Brand: MEDLINE

## (undated) DEVICE — 3000CC GUARDIAN II: Brand: GUARDIAN

## (undated) DEVICE — SUTURE PLN GUT SZ 3-0 L27IN ABSRB YELLOWISH TAN L70MM XLH 52T

## (undated) DEVICE — TIP CLEANER: Brand: VALLEYLAB

## (undated) DEVICE — (D)PREP SKN CHLRAPRP APPL 26ML -- CONVERT TO ITEM 371833

## (undated) DEVICE — LARGE, DISPOSABLE ALEXIS O C-SECTION PROTECTOR - RETRACTOR: Brand: ALEXIS ® O C-SECTION PROTECTOR - RETRACTOR

## (undated) DEVICE — MEDI-VAC NON-CONDUCTIVE SUCTION TUBING: Brand: CARDINAL HEALTH

## (undated) DEVICE — SUTURE VCRL SZ 2-0 L36IN ABSRB VLT L36MM CT-1 1/2 CIR J345H

## (undated) DEVICE — SOLIDIFIER MEDC 1200ML -- CONVERT TO 356117

## (undated) DEVICE — REM POLYHESIVE ADULT PATIENT RETURN ELECTRODE: Brand: VALLEYLAB

## (undated) DEVICE — STAPLER SKIN SQ 30 ABSRB STPL DISP INSORB

## (undated) DEVICE — SOLUTION IV 1000ML 0.9% SOD CHL

## (undated) DEVICE — ABDOMINAL PAD: Brand: DERMACEA

## (undated) DEVICE — SUTURE MCRYL SZ 0 L36IN ABSRB VLT L48MM CTX 1/2 CIR Y398H

## (undated) DEVICE — PACK PROCEDURE SURG C SECT KT SMH

## (undated) DEVICE — STERILE POLYISOPRENE POWDER-FREE SURGICAL GLOVES WITH EMOLLIENT COATING: Brand: PROTEXIS

## (undated) DEVICE — POOLE SUCTION INSTRUMENT WITH REMOVABLE SHEATH: Brand: POOLE

## (undated) DEVICE — C-SECTION II-LF: Brand: MEDLINE INDUSTRIES, INC.